# Patient Record
Sex: FEMALE | Race: WHITE | NOT HISPANIC OR LATINO | Employment: UNEMPLOYED | RURAL
[De-identification: names, ages, dates, MRNs, and addresses within clinical notes are randomized per-mention and may not be internally consistent; named-entity substitution may affect disease eponyms.]

---

## 2018-02-05 ENCOUNTER — HISTORICAL (OUTPATIENT)
Dept: ADMINISTRATIVE | Facility: HOSPITAL | Age: 33
End: 2018-02-05

## 2018-02-07 LAB
LAB AP CLINICAL INFORMATION: NORMAL
LAB AP GENERAL CAT - HISTORICAL: NORMAL
LAB AP INTERPRETATION/RESULT - HISTORICAL: NEGATIVE
LAB AP SPECIMEN ADEQUACY - HISTORICAL: NORMAL
LAB AP SPECIMEN SUBMITTED - HISTORICAL: NORMAL

## 2020-05-07 ENCOUNTER — HISTORICAL (OUTPATIENT)
Dept: ADMINISTRATIVE | Facility: HOSPITAL | Age: 35
End: 2020-05-07

## 2020-05-12 LAB
DSDNA IGG SERPL IA-ACNC: 11 IU (ref 0–24)
DSDNA IGG SERPL IA-ACNC: NEGATIVE [IU]/ML
ENA AB SER QL IA: 4.4 EUS (ref 0–19.9)
ENA AB SER QL IA: NEGATIVE

## 2020-08-13 ENCOUNTER — HISTORICAL (OUTPATIENT)
Dept: ADMINISTRATIVE | Facility: HOSPITAL | Age: 35
End: 2020-08-13

## 2020-08-13 LAB
ALBUMIN SERPL BCP-MCNC: 3.9 G/DL (ref 3.5–5)
ALBUMIN/GLOB SERPL: 1 {RATIO}
ALP SERPL-CCNC: 94 U/L (ref 37–98)
ALT SERPL W P-5'-P-CCNC: 32 U/L (ref 13–56)
AMPHET UR QL SCN: POSITIVE NG/ML
ANION GAP SERPL CALCULATED.3IONS-SCNC: 10 MMOL/L
AST SERPL W P-5'-P-CCNC: 27 U/L (ref 15–37)
BARBITURATES UR QL SCN: NEGATIVE NG/ML
BASOPHILS # BLD AUTO: 0.05 X10E3/UL (ref 0–0.2)
BASOPHILS NFR BLD AUTO: 0.5 % (ref 0–1)
BENZODIAZ METAB UR QL SCN: NEGATIVE NG/ML
BILIRUB SERPL-MCNC: 0.1 MG/DL (ref 0–1.2)
BILIRUB UR QL STRIP: NEGATIVE MG/DL
BUN SERPL-MCNC: 19 MG/DL (ref 7–18)
BUN/CREAT SERPL: 20
CALCIUM SERPL-MCNC: 9 MG/DL (ref 8.5–10.1)
CANNABINOIDS UR QL SCN: NEGATIVE NG/ML
CHLORIDE SERPL-SCNC: 103 MMOL/L (ref 98–107)
CLARITY UR: CLEAR
CO2 SERPL-SCNC: 29 MMOL/L (ref 21–32)
COCAINE UR QL SCN: NEGATIVE NG/ML
COLOR UR: YELLOW
CREAT SERPL-MCNC: 0.95 MG/DL (ref 0.55–1.02)
EOSINOPHIL # BLD AUTO: 0.26 X10E3/UL (ref 0–0.5)
EOSINOPHIL NFR BLD AUTO: 2.7 % (ref 1–4)
ERYTHROCYTE [DISTWIDTH] IN BLOOD BY AUTOMATED COUNT: 13.5 % (ref 11.5–14.5)
GLOBULIN SER-MCNC: 4.1 G/DL (ref 2–4)
GLUCOSE SERPL-MCNC: 86 MG/DL (ref 74–106)
GLUCOSE UR STRIP-MCNC: NEGATIVE MG/DL
HCT VFR BLD AUTO: 38 % (ref 38–47)
HGB BLD-MCNC: 12 G/DL (ref 12–16)
IMM GRANULOCYTES # BLD AUTO: 0.03 X10E3/UL (ref 0–0.04)
IMM GRANULOCYTES NFR BLD: 0.3 % (ref 0–0.4)
KETONES UR STRIP-SCNC: NEGATIVE MG/DL
LEUKOCYTE ESTERASE UR QL STRIP: NEGATIVE LEU/UL
LYMPHOCYTES # BLD AUTO: 2.37 X10E3/UL (ref 1–4.8)
LYMPHOCYTES NFR BLD AUTO: 24.9 % (ref 27–41)
MCH RBC QN AUTO: 29.2 PG (ref 27–31)
MCHC RBC AUTO-ENTMCNC: 31.6 G/DL (ref 32–36)
MCV RBC AUTO: 92.5 FL (ref 80–96)
MONOCYTES # BLD AUTO: 0.73 X10E3/UL (ref 0–0.8)
MONOCYTES NFR BLD AUTO: 7.7 % (ref 2–6)
MPC BLD CALC-MCNC: 9.8 FL (ref 9.4–12.4)
NEUTROPHILS # BLD AUTO: 6.08 X10E3/UL (ref 1.8–7.7)
NEUTROPHILS NFR BLD AUTO: 63.9 % (ref 53–65)
NITRITE UR QL STRIP: NEGATIVE
NRBC # BLD AUTO: 0 X10E3/UL (ref 0–0)
NRBC, AUTO (.00): 0 /100 (ref 0–0)
OPIATES UR QL SCN: NEGATIVE NG/ML
PCP UR QL SCN: NEGATIVE NG/ML
PH UR STRIP: 5 PH UNITS (ref 5–8)
PLATELET # BLD AUTO: 411 X10E3/UL (ref 150–400)
POTASSIUM SERPL-SCNC: 4.5 MMOL/L (ref 3.5–5.1)
PROT SERPL-MCNC: 8 G/DL (ref 6.4–8.2)
PROT UR QL STRIP: NEGATIVE MG/DL
RBC # BLD AUTO: 4.11 X10E6/UL (ref 4.2–5.4)
RBC # UR STRIP: NEGATIVE ERY/UL
SODIUM SERPL-SCNC: 137 MMOL/L (ref 136–145)
SP GR UR STRIP: 1.03 (ref 1–1.03)
UROBILINOGEN UR STRIP-ACNC: 0.2 EU/DL
WBC # BLD AUTO: 9.52 X10E3/UL (ref 4.5–11)

## 2020-10-09 ENCOUNTER — HISTORICAL (OUTPATIENT)
Dept: ADMINISTRATIVE | Facility: HOSPITAL | Age: 35
End: 2020-10-09

## 2021-06-12 ENCOUNTER — HOSPITAL ENCOUNTER (EMERGENCY)
Facility: HOSPITAL | Age: 36
Discharge: HOME | End: 2021-06-12
Payer: COMMERCIAL

## 2021-06-12 VITALS
OXYGEN SATURATION: 96 % | SYSTOLIC BLOOD PRESSURE: 112 MMHG | TEMPERATURE: 99 F | HEART RATE: 95 BPM | HEIGHT: 65 IN | WEIGHT: 279 LBS | BODY MASS INDEX: 46.48 KG/M2 | RESPIRATION RATE: 18 BRPM | DIASTOLIC BLOOD PRESSURE: 55 MMHG

## 2021-06-12 DIAGNOSIS — R07.9 CHEST PAIN: ICD-10-CM

## 2021-06-12 DIAGNOSIS — F41.9 ANXIETY: ICD-10-CM

## 2021-06-12 DIAGNOSIS — R00.2 PALPITATIONS: Primary | ICD-10-CM

## 2021-06-12 LAB
ALBUMIN SERPL BCP-MCNC: 3.5 G/DL (ref 3.5–5)
ALBUMIN/GLOB SERPL: 0.9 {RATIO}
ALP SERPL-CCNC: 88 U/L (ref 37–98)
ALT SERPL W P-5'-P-CCNC: 20 U/L (ref 13–56)
ANION GAP SERPL CALCULATED.3IONS-SCNC: 13 MMOL/L (ref 7–16)
AST SERPL W P-5'-P-CCNC: 19 U/L (ref 15–37)
BASOPHILS # BLD AUTO: 0.01 K/UL (ref 0–0.2)
BASOPHILS NFR BLD AUTO: 0.1 % (ref 0–1)
BILIRUB SERPL-MCNC: 0.3 MG/DL (ref 0–1.2)
BILIRUB UR QL STRIP: NEGATIVE
BUN SERPL-MCNC: 14 MG/DL (ref 7–18)
BUN/CREAT SERPL: 16 (ref 6–20)
CALCIUM SERPL-MCNC: 9 MG/DL (ref 8.5–10.1)
CHLORIDE SERPL-SCNC: 106 MMOL/L (ref 98–107)
CLARITY UR: CLEAR
CO2 SERPL-SCNC: 26 MMOL/L (ref 21–32)
COLOR UR: YELLOW
CREAT SERPL-MCNC: 0.89 MG/DL (ref 0.55–1.02)
DIFFERENTIAL METHOD BLD: ABNORMAL
EOSINOPHIL # BLD AUTO: 0.14 K/UL (ref 0–0.5)
EOSINOPHIL NFR BLD AUTO: 1.5 % (ref 1–4)
ERYTHROCYTE [DISTWIDTH] IN BLOOD BY AUTOMATED COUNT: 13.2 % (ref 11.5–14.5)
GLOBULIN SER-MCNC: 3.8 G/DL (ref 2–4)
GLUCOSE SERPL-MCNC: 89 MG/DL (ref 74–106)
GLUCOSE UR STRIP-MCNC: NEGATIVE MG/DL
HCG UR QL IA.RAPID: NEGATIVE
HCT VFR BLD AUTO: 34.9 % (ref 38–47)
HGB BLD-MCNC: 11.2 G/DL (ref 12–16)
KETONES UR STRIP-SCNC: NEGATIVE MG/DL
LEUKOCYTE ESTERASE UR QL STRIP: NEGATIVE
LYMPHOCYTES # BLD AUTO: 2.71 K/UL (ref 1–4.8)
LYMPHOCYTES NFR BLD AUTO: 28.7 % (ref 27–41)
MCH RBC QN AUTO: 28.3 PG (ref 27–31)
MCHC RBC AUTO-ENTMCNC: 32.1 G/DL (ref 32–36)
MCV RBC AUTO: 88.1 FL (ref 80–96)
MONOCYTES # BLD AUTO: 0.88 K/UL (ref 0–0.8)
MONOCYTES NFR BLD AUTO: 9.3 % (ref 2–6)
MPC BLD CALC-MCNC: 10.5 FL (ref 9.4–12.4)
NEUTROPHILS # BLD AUTO: 5.7 K/UL (ref 1.8–7.7)
NEUTROPHILS NFR BLD AUTO: 60.4 % (ref 53–65)
NITRITE UR QL STRIP: NEGATIVE
NT-PROBNP SERPL-MCNC: 56 PG/ML (ref 1–125)
PH UR STRIP: 6 PH UNITS
PLATELET # BLD AUTO: 320 K/UL (ref 150–400)
POTASSIUM SERPL-SCNC: 3.4 MMOL/L (ref 3.5–5.1)
PROT SERPL-MCNC: 7.3 G/DL (ref 6.4–8.2)
PROT UR QL STRIP: NEGATIVE
RBC # BLD AUTO: 3.96 M/UL (ref 4.2–5.4)
RBC # UR STRIP: NEGATIVE /UL
SODIUM SERPL-SCNC: 142 MMOL/L (ref 136–145)
SP GR UR STRIP: 1.01
TROPONIN I SERPL-MCNC: <0.017 NG/ML
UROBILINOGEN UR STRIP-ACNC: 0.2 MG/DL
WBC # BLD AUTO: 9.44 K/UL (ref 4.5–11)

## 2021-06-12 PROCEDURE — 84484 ASSAY OF TROPONIN QUANT: CPT | Performed by: FAMILY MEDICINE

## 2021-06-12 PROCEDURE — 36415 COLL VENOUS BLD VENIPUNCTURE: CPT | Performed by: FAMILY MEDICINE

## 2021-06-12 PROCEDURE — 81025 URINE PREGNANCY TEST: CPT | Performed by: FAMILY MEDICINE

## 2021-06-12 PROCEDURE — 99283 PR EMERGENCY DEPT VISIT,LEVEL III: ICD-10-PCS | Mod: ,,, | Performed by: FAMILY MEDICINE

## 2021-06-12 PROCEDURE — 99285 EMERGENCY DEPT VISIT HI MDM: CPT | Mod: 25

## 2021-06-12 PROCEDURE — 94761 N-INVAS EAR/PLS OXIMETRY MLT: CPT

## 2021-06-12 PROCEDURE — 93010 ELECTROCARDIOGRAM REPORT: CPT | Performed by: HOSPITALIST

## 2021-06-12 PROCEDURE — 81003 URINALYSIS AUTO W/O SCOPE: CPT | Performed by: FAMILY MEDICINE

## 2021-06-12 PROCEDURE — 99283 EMERGENCY DEPT VISIT LOW MDM: CPT | Mod: ,,, | Performed by: FAMILY MEDICINE

## 2021-06-12 PROCEDURE — 83880 ASSAY OF NATRIURETIC PEPTIDE: CPT | Performed by: FAMILY MEDICINE

## 2021-06-12 PROCEDURE — 80053 COMPREHEN METABOLIC PANEL: CPT | Performed by: FAMILY MEDICINE

## 2021-06-12 PROCEDURE — 93005 ELECTROCARDIOGRAM TRACING: CPT

## 2021-06-12 PROCEDURE — 93010 EKG 12-LEAD: ICD-10-PCS | Mod: ,,, | Performed by: HOSPITALIST

## 2021-06-12 PROCEDURE — 85025 COMPLETE CBC W/AUTO DIFF WBC: CPT | Performed by: FAMILY MEDICINE

## 2021-06-12 RX ORDER — BUSPIRONE HYDROCHLORIDE 15 MG/1
15 TABLET ORAL 3 TIMES DAILY
COMMUNITY
End: 2022-04-25

## 2021-06-12 RX ORDER — DEXTROAMPHETAMINE SACCHARATE, AMPHETAMINE ASPARTATE MONOHYDRATE, DEXTROAMPHETAMINE SULFATE AND AMPHETAMINE SULFATE 5; 5; 5; 5 MG/1; MG/1; MG/1; MG/1
20 CAPSULE, EXTENDED RELEASE ORAL 2 TIMES DAILY
COMMUNITY
End: 2022-01-16

## 2021-10-10 ENCOUNTER — OFFICE VISIT (OUTPATIENT)
Dept: FAMILY MEDICINE | Facility: CLINIC | Age: 36
End: 2021-10-10
Payer: COMMERCIAL

## 2021-10-10 VITALS
HEART RATE: 74 BPM | HEIGHT: 65 IN | RESPIRATION RATE: 17 BRPM | OXYGEN SATURATION: 98 % | WEIGHT: 272.19 LBS | SYSTOLIC BLOOD PRESSURE: 128 MMHG | BODY MASS INDEX: 45.35 KG/M2 | TEMPERATURE: 99 F | DIASTOLIC BLOOD PRESSURE: 72 MMHG

## 2021-10-10 DIAGNOSIS — M54.50 ACUTE RIGHT-SIDED LOW BACK PAIN WITHOUT SCIATICA: Primary | ICD-10-CM

## 2021-10-10 PROCEDURE — 3078F DIAST BP <80 MM HG: CPT | Mod: ,,, | Performed by: FAMILY MEDICINE

## 2021-10-10 PROCEDURE — 3008F BODY MASS INDEX DOCD: CPT | Mod: ,,, | Performed by: FAMILY MEDICINE

## 2021-10-10 PROCEDURE — 96372 PR INJECTION,THERAP/PROPH/DIAG2ST, IM OR SUBCUT: ICD-10-PCS | Mod: ,,, | Performed by: FAMILY MEDICINE

## 2021-10-10 PROCEDURE — 3008F PR BODY MASS INDEX (BMI) DOCUMENTED: ICD-10-PCS | Mod: ,,, | Performed by: FAMILY MEDICINE

## 2021-10-10 PROCEDURE — 99051 MED SERV EVE/WKEND/HOLIDAY: CPT | Mod: ,,, | Performed by: FAMILY MEDICINE

## 2021-10-10 PROCEDURE — 96372 THER/PROPH/DIAG INJ SC/IM: CPT | Mod: ,,, | Performed by: FAMILY MEDICINE

## 2021-10-10 PROCEDURE — 99213 PR OFFICE/OUTPT VISIT, EST, LEVL III, 20-29 MIN: ICD-10-PCS | Mod: 25,,, | Performed by: FAMILY MEDICINE

## 2021-10-10 PROCEDURE — 1160F PR REVIEW ALL MEDS BY PRESCRIBER/CLIN PHARMACIST DOCUMENTED: ICD-10-PCS | Mod: ,,, | Performed by: FAMILY MEDICINE

## 2021-10-10 PROCEDURE — 1159F PR MEDICATION LIST DOCUMENTED IN MEDICAL RECORD: ICD-10-PCS | Mod: ,,, | Performed by: FAMILY MEDICINE

## 2021-10-10 PROCEDURE — 3074F PR MOST RECENT SYSTOLIC BLOOD PRESSURE < 130 MM HG: ICD-10-PCS | Mod: ,,, | Performed by: FAMILY MEDICINE

## 2021-10-10 PROCEDURE — 1159F MED LIST DOCD IN RCRD: CPT | Mod: ,,, | Performed by: FAMILY MEDICINE

## 2021-10-10 PROCEDURE — 3078F PR MOST RECENT DIASTOLIC BLOOD PRESSURE < 80 MM HG: ICD-10-PCS | Mod: ,,, | Performed by: FAMILY MEDICINE

## 2021-10-10 PROCEDURE — 3074F SYST BP LT 130 MM HG: CPT | Mod: ,,, | Performed by: FAMILY MEDICINE

## 2021-10-10 PROCEDURE — 1160F RVW MEDS BY RX/DR IN RCRD: CPT | Mod: ,,, | Performed by: FAMILY MEDICINE

## 2021-10-10 PROCEDURE — 99051 PR MEDICAL SERVICES, EVE/WKEND/HOLIDAY: ICD-10-PCS | Mod: ,,, | Performed by: FAMILY MEDICINE

## 2021-10-10 PROCEDURE — 99213 OFFICE O/P EST LOW 20 MIN: CPT | Mod: 25,,, | Performed by: FAMILY MEDICINE

## 2021-10-10 RX ORDER — TRAZODONE HYDROCHLORIDE 50 MG/1
75 TABLET ORAL NIGHTLY PRN
COMMUNITY
End: 2022-01-16

## 2021-10-10 RX ORDER — DEXAMETHASONE SODIUM PHOSPHATE 4 MG/ML
6 INJECTION, SOLUTION INTRA-ARTICULAR; INTRALESIONAL; INTRAMUSCULAR; INTRAVENOUS; SOFT TISSUE
Status: COMPLETED | OUTPATIENT
Start: 2021-10-10 | End: 2021-10-10

## 2021-10-10 RX ORDER — TIZANIDINE 4 MG/1
TABLET ORAL
Qty: 30 TABLET | Refills: 2 | Status: SHIPPED | OUTPATIENT
Start: 2021-10-10 | End: 2021-10-10 | Stop reason: SDUPTHER

## 2021-10-10 RX ORDER — TIZANIDINE 4 MG/1
TABLET ORAL
Qty: 30 TABLET | Refills: 2 | Status: SHIPPED | OUTPATIENT
Start: 2021-10-10 | End: 2021-10-18

## 2021-10-10 RX ORDER — TRAMADOL HYDROCHLORIDE 50 MG/1
50 TABLET ORAL EVERY 6 HOURS
Qty: 15 TABLET | Refills: 0 | Status: SHIPPED | OUTPATIENT
Start: 2021-10-10 | End: 2022-04-25

## 2021-10-10 RX ORDER — TRAMADOL HYDROCHLORIDE 50 MG/1
50 TABLET ORAL EVERY 6 HOURS
Qty: 15 TABLET | Refills: 0 | Status: SHIPPED | OUTPATIENT
Start: 2021-10-10 | End: 2021-10-10 | Stop reason: SDUPTHER

## 2021-10-10 RX ADMIN — DEXAMETHASONE SODIUM PHOSPHATE 6 MG: 4 INJECTION, SOLUTION INTRA-ARTICULAR; INTRALESIONAL; INTRAMUSCULAR; INTRAVENOUS; SOFT TISSUE at 02:10

## 2022-01-16 ENCOUNTER — OFFICE VISIT (OUTPATIENT)
Dept: FAMILY MEDICINE | Facility: CLINIC | Age: 37
End: 2022-01-16
Payer: COMMERCIAL

## 2022-01-16 VITALS
SYSTOLIC BLOOD PRESSURE: 140 MMHG | HEIGHT: 65 IN | TEMPERATURE: 98 F | RESPIRATION RATE: 17 BRPM | DIASTOLIC BLOOD PRESSURE: 83 MMHG | BODY MASS INDEX: 43.32 KG/M2 | OXYGEN SATURATION: 99 % | HEART RATE: 98 BPM | WEIGHT: 260 LBS

## 2022-01-16 DIAGNOSIS — Z11.52 ENCOUNTER FOR SCREENING LABORATORY TESTING FOR COVID-19 VIRUS: Primary | ICD-10-CM

## 2022-01-16 DIAGNOSIS — J06.9 VIRAL URI WITH COUGH: ICD-10-CM

## 2022-01-16 PROBLEM — F41.9 ANXIETY: Status: ACTIVE | Noted: 2022-01-16

## 2022-01-16 PROBLEM — G47.00 INSOMNIA: Status: ACTIVE | Noted: 2022-01-16

## 2022-01-16 PROBLEM — F60.3 BORDERLINE PERSONALITY DISORDER: Status: ACTIVE | Noted: 2022-01-16

## 2022-01-16 PROBLEM — J31.2 CHRONIC SORE THROAT: Status: ACTIVE | Noted: 2017-04-26

## 2022-01-16 PROBLEM — G89.29 CHRONIC BACK PAIN: Status: ACTIVE | Noted: 2022-01-16

## 2022-01-16 PROBLEM — M77.9 TENDINITIS: Status: ACTIVE | Noted: 2022-01-16

## 2022-01-16 PROBLEM — F90.9 ATTENTION DEFICIT HYPERACTIVITY DISORDER: Status: ACTIVE | Noted: 2020-10-12

## 2022-01-16 PROBLEM — R07.0 PAIN IN THROAT: Status: ACTIVE | Noted: 2017-04-05

## 2022-01-16 PROBLEM — M54.9 CHRONIC BACK PAIN: Status: ACTIVE | Noted: 2022-01-16

## 2022-01-16 LAB
CTP QC/QA: YES
SARS-COV-2 AG RESP QL IA.RAPID: NEGATIVE

## 2022-01-16 PROCEDURE — 3077F SYST BP >= 140 MM HG: CPT | Mod: ,,, | Performed by: NURSE PRACTITIONER

## 2022-01-16 PROCEDURE — 3008F PR BODY MASS INDEX (BMI) DOCUMENTED: ICD-10-PCS | Mod: ,,, | Performed by: NURSE PRACTITIONER

## 2022-01-16 PROCEDURE — 99213 PR OFFICE/OUTPT VISIT, EST, LEVL III, 20-29 MIN: ICD-10-PCS | Mod: ,,, | Performed by: NURSE PRACTITIONER

## 2022-01-16 PROCEDURE — 99051 PR MEDICAL SERVICES, EVE/WKEND/HOLIDAY: ICD-10-PCS | Mod: ,,, | Performed by: NURSE PRACTITIONER

## 2022-01-16 PROCEDURE — 1159F PR MEDICATION LIST DOCUMENTED IN MEDICAL RECORD: ICD-10-PCS | Mod: ,,, | Performed by: NURSE PRACTITIONER

## 2022-01-16 PROCEDURE — 87426 SARSCOV CORONAVIRUS AG IA: CPT | Mod: QW,,, | Performed by: NURSE PRACTITIONER

## 2022-01-16 PROCEDURE — 99213 OFFICE O/P EST LOW 20 MIN: CPT | Mod: ,,, | Performed by: NURSE PRACTITIONER

## 2022-01-16 PROCEDURE — 87426 SARS CORONAVIRUS 2 ANTIGEN POCT: ICD-10-PCS | Mod: QW,,, | Performed by: NURSE PRACTITIONER

## 2022-01-16 PROCEDURE — 3079F DIAST BP 80-89 MM HG: CPT | Mod: ,,, | Performed by: NURSE PRACTITIONER

## 2022-01-16 PROCEDURE — 3077F PR MOST RECENT SYSTOLIC BLOOD PRESSURE >= 140 MM HG: ICD-10-PCS | Mod: ,,, | Performed by: NURSE PRACTITIONER

## 2022-01-16 PROCEDURE — 1160F RVW MEDS BY RX/DR IN RCRD: CPT | Mod: ,,, | Performed by: NURSE PRACTITIONER

## 2022-01-16 PROCEDURE — 99051 MED SERV EVE/WKEND/HOLIDAY: CPT | Mod: ,,, | Performed by: NURSE PRACTITIONER

## 2022-01-16 PROCEDURE — 1159F MED LIST DOCD IN RCRD: CPT | Mod: ,,, | Performed by: NURSE PRACTITIONER

## 2022-01-16 PROCEDURE — 1160F PR REVIEW ALL MEDS BY PRESCRIBER/CLIN PHARMACIST DOCUMENTED: ICD-10-PCS | Mod: ,,, | Performed by: NURSE PRACTITIONER

## 2022-01-16 PROCEDURE — 3079F PR MOST RECENT DIASTOLIC BLOOD PRESSURE 80-89 MM HG: ICD-10-PCS | Mod: ,,, | Performed by: NURSE PRACTITIONER

## 2022-01-16 PROCEDURE — 3008F BODY MASS INDEX DOCD: CPT | Mod: ,,, | Performed by: NURSE PRACTITIONER

## 2022-01-16 RX ORDER — PROMETHAZINE HYDROCHLORIDE AND DEXTROMETHORPHAN HYDROBROMIDE 6.25; 15 MG/5ML; MG/5ML
5 SYRUP ORAL EVERY 6 HOURS PRN
Qty: 150 ML | Refills: 0 | Status: SHIPPED | OUTPATIENT
Start: 2022-01-16 | End: 2022-01-26

## 2022-01-16 RX ORDER — DICLOFENAC SODIUM 75 MG/1
TABLET, DELAYED RELEASE ORAL
COMMUNITY
End: 2022-04-25

## 2022-01-16 RX ORDER — MELOXICAM 7.5 MG/1
TABLET ORAL
COMMUNITY
End: 2022-04-25

## 2022-01-16 RX ORDER — BUPROPION HYDROCHLORIDE 300 MG/1
TABLET ORAL
COMMUNITY
End: 2022-04-25

## 2022-01-16 RX ORDER — GABAPENTIN 100 MG/1
CAPSULE ORAL
COMMUNITY
End: 2022-04-25

## 2022-01-16 RX ORDER — ARIPIPRAZOLE 10 MG/1
TABLET ORAL
COMMUNITY
End: 2022-08-18

## 2022-01-16 RX ORDER — ALPRAZOLAM 0.25 MG/1
TABLET ORAL
COMMUNITY
Start: 2021-12-15 | End: 2022-05-19

## 2022-01-16 RX ORDER — DEXTROAMPHETAMINE SACCHARATE, AMPHETAMINE ASPARTATE, DEXTROAMPHETAMINE SULFATE AND AMPHETAMINE SULFATE 5; 5; 5; 5 MG/1; MG/1; MG/1; MG/1
TABLET ORAL
COMMUNITY
End: 2022-08-18

## 2022-01-16 RX ORDER — ZOLPIDEM TARTRATE 10 MG/1
TABLET ORAL
COMMUNITY
End: 2022-04-25

## 2022-01-16 RX ORDER — TOPIRAMATE 50 MG/1
TABLET, FILM COATED ORAL
COMMUNITY
End: 2022-04-25

## 2022-01-16 RX ORDER — TRAZODONE HYDROCHLORIDE 150 MG/1
TABLET ORAL
COMMUNITY
End: 2022-04-25

## 2022-01-16 NOTE — PROGRESS NOTES
"Subjective:       Patient ID: Mercedes Saldivar is a 36 y.o. female.    Chief Complaint: COVID-19 Concerns (Fever/sore throat/runny nose/body aches/fatigue/exposed)    S/S for a couple of days. Boyfriend in clinic with her and tested positive for Covid.    Review of Systems   Constitutional: Positive for chills, fever and malaise/fatigue.   HENT: Positive for congestion and sore throat. Negative for ear discharge, ear pain and sinus pain.    Respiratory: Positive for cough. Negative for shortness of breath and wheezing.    Cardiovascular: Negative.    Gastrointestinal: Negative.    Genitourinary: Negative.    Musculoskeletal: Positive for myalgias.   Neurological: Positive for headaches.          Reviewed family, medical, surgical, and social history.    Objective:      BP (!) 140/83   Pulse 98   Temp 98.1 °F (36.7 °C)   Resp 17   Ht 5' 5" (1.651 m)   Wt 117.9 kg (260 lb)   SpO2 99%   BMI 43.27 kg/m²   Physical Exam  Vitals and nursing note reviewed.   Constitutional:       Appearance: Normal appearance.   HENT:      Head: Normocephalic and atraumatic.      Right Ear: Hearing, tympanic membrane, ear canal and external ear normal.      Left Ear: Hearing, tympanic membrane, ear canal and external ear normal.      Nose: Nasal tenderness, mucosal edema, congestion and rhinorrhea present. Rhinorrhea is clear.      Right Turbinates: Enlarged and swollen.      Left Turbinates: Enlarged and swollen.      Mouth/Throat:      Lips: Pink.      Mouth: Mucous membranes are moist.      Pharynx: Uvula midline. Posterior oropharyngeal erythema present. No pharyngeal swelling, oropharyngeal exudate or uvula swelling.      Tonsils: No tonsillar exudate or tonsillar abscesses.   Cardiovascular:      Rate and Rhythm: Normal rate and regular rhythm.      Heart sounds: Normal heart sounds.   Pulmonary:      Effort: Pulmonary effort is normal.      Breath sounds: Normal breath sounds.   Skin:     General: Skin is warm and dry. "   Neurological:      Mental Status: She is alert.   Psychiatric:         Mood and Affect: Mood normal.         Behavior: Behavior normal.         Thought Content: Thought content normal.         Judgment: Judgment normal.            Office Visit on 01/16/2022   Component Date Value Ref Range Status    SARS Coronavirus 2 Antigen 01/16/2022 Negative  Negative Final     Acceptable 01/16/2022 Yes   Final      Assessment:       1. Encounter for screening laboratory testing for COVID-19 virus    2. Viral URI with cough        Plan:       Encounter for screening laboratory testing for COVID-19 virus  -     SARS Coronavirus 2 Antigen, POCT    Viral URI with cough  -     promethazine-dextromethorphan (PROMETHAZINE-DM) 6.25-15 mg/5 mL Syrp; Take 5 mLs by mouth every 6 (six) hours as needed (cough).  Dispense: 150 mL; Refill: 0    Quarantine for 5-7 days  OTC meds for symptomatic relief  Drink plenty of fluids  Go to ER with any respiratory distress  Copy of result and work/school note given. Copy of these instructions given  RTC PRN          Risks, benefits, and side effects were discussed with the patient. All questions were answered to the fullest satisfaction of the patient, and pt verbalized understanding and agreement to treatment plan. Pt was to call with any new or worsening symptoms, or present to the ER.

## 2022-04-25 ENCOUNTER — OFFICE VISIT (OUTPATIENT)
Dept: VASCULAR SURGERY | Facility: CLINIC | Age: 37
End: 2022-04-25
Payer: COMMERCIAL

## 2022-04-25 VITALS
DIASTOLIC BLOOD PRESSURE: 84 MMHG | WEIGHT: 272.63 LBS | RESPIRATION RATE: 12 BRPM | HEART RATE: 76 BPM | HEIGHT: 65 IN | SYSTOLIC BLOOD PRESSURE: 134 MMHG | BODY MASS INDEX: 45.42 KG/M2

## 2022-04-25 DIAGNOSIS — M79.605 PAIN OF LEFT LOWER EXTREMITY: Primary | ICD-10-CM

## 2022-04-25 DIAGNOSIS — I87.1 COMPRESSION OF VEIN: ICD-10-CM

## 2022-04-25 DIAGNOSIS — R60.0 EDEMA OF LEFT LOWER EXTREMITY: ICD-10-CM

## 2022-04-25 DIAGNOSIS — L81.9 HYPERPIGMENTATION OF SKIN: ICD-10-CM

## 2022-04-25 PROCEDURE — 3008F PR BODY MASS INDEX (BMI) DOCUMENTED: ICD-10-PCS | Mod: ,,, | Performed by: NURSE PRACTITIONER

## 2022-04-25 PROCEDURE — 1159F PR MEDICATION LIST DOCUMENTED IN MEDICAL RECORD: ICD-10-PCS | Mod: ,,, | Performed by: NURSE PRACTITIONER

## 2022-04-25 PROCEDURE — 3075F SYST BP GE 130 - 139MM HG: CPT | Mod: ,,, | Performed by: NURSE PRACTITIONER

## 2022-04-25 PROCEDURE — 3008F BODY MASS INDEX DOCD: CPT | Mod: ,,, | Performed by: NURSE PRACTITIONER

## 2022-04-25 PROCEDURE — 99204 PR OFFICE/OUTPT VISIT, NEW, LEVL IV, 45-59 MIN: ICD-10-PCS | Mod: S$PBB,,, | Performed by: NURSE PRACTITIONER

## 2022-04-25 PROCEDURE — 99214 OFFICE O/P EST MOD 30 MIN: CPT | Mod: PBBFAC | Performed by: NURSE PRACTITIONER

## 2022-04-25 PROCEDURE — 99204 OFFICE O/P NEW MOD 45 MIN: CPT | Mod: S$PBB,,, | Performed by: NURSE PRACTITIONER

## 2022-04-25 PROCEDURE — 3079F DIAST BP 80-89 MM HG: CPT | Mod: ,,, | Performed by: NURSE PRACTITIONER

## 2022-04-25 PROCEDURE — 1159F MED LIST DOCD IN RCRD: CPT | Mod: ,,, | Performed by: NURSE PRACTITIONER

## 2022-04-25 PROCEDURE — 3075F PR MOST RECENT SYSTOLIC BLOOD PRESS GE 130-139MM HG: ICD-10-PCS | Mod: ,,, | Performed by: NURSE PRACTITIONER

## 2022-04-25 PROCEDURE — 3079F PR MOST RECENT DIASTOLIC BLOOD PRESSURE 80-89 MM HG: ICD-10-PCS | Mod: ,,, | Performed by: NURSE PRACTITIONER

## 2022-04-25 RX ORDER — ALBUTEROL SULFATE 90 UG/1
AEROSOL, METERED RESPIRATORY (INHALATION)
COMMUNITY
Start: 2022-01-19 | End: 2022-08-18

## 2022-04-25 NOTE — PROGRESS NOTES
VEIN CENTER CLINIC NOTE    Patient ID: Mercedes Saldivar is a 37 y.o. female.    I. HISTORY     Chief Complaint:   Chief Complaint   Patient presents with    Leg Pain     Room 4  Pt here per self for leg pain, last seen here 07/2015 when referred to [facility] at that time.  Stents to left iliac per Dr. Minna Meyer        HPI: Mercedes Saldivar is a 37 y.o. female who is referred here today by self referral for evaluation of left leg pain with swelling.  Symptoms are as described in the chart below and began several  years ago.  Location is left lower leg. Symptoms are progressive and functionally impairing at the end of the day.  History of venous interventions includes Left GSV ablation in 2015 by Dr. Steven Llamas. Patient also has history of Left Iliac stents placement by Dr. Meyer at the [facility] in [place]. She reports she has had continued left leg pain ever since prior to and after her procedures in 2015. She report her leg symptoms improved somewhat for about 2 weeks after Vein procedures but her pain never completely resolved. She says her pain has progressively gotten worse and is now to the point that it is not tolerable. No open ulcers. No history of DVT.  No family history of venous disease.  She reports she has continued to wear 20-30 mmHg compression since 2015, she elevates her legs periodically throughout the day and performs calf pumping exercises, as well as, elevates her legs periodically throughout the day.        Venous Disease Medical Necessity Documentation Initial Visit Date: 4-26-22 Return Check Date:    1. Have you ever had a rupture or bleed from a varicose vein in your leg(s)?              [] Yes  [x] No   [] Yes   [] No   2. Have you ever been diagnosed with phlebitis, cellulitis, or inflammation in the area of the varicose veins of  your leg(s)?  [] Yes  [x] No    [] Yes   [] No   3. Do you have darkened or inflamed skin on your legs?   [] Yes   [x] No   [] Yes   [] No   4. Do you have  leg swelling?     [x] Yes   [] No   [] Yes   [] No   5. Do you have leg pain?   [x] Yes   [] No   [] Yes   [] No   If yes, describe the type of pain?    []   Stabbing []  Radiating []  Aching   []  Tightness [x]  Throbbing               [x]  Burning []  Cramping ( +)  Nagging pain left leg              6. Do you have leg discomfort?   [x] Yes   [] No   [] Yes   [] No   If yes, describe the type of discomfort?    []  Heaviness [x]  Fullness   [x]  Restlessness [x] Tired/Fatigued [x] Itching              7. Have you ever worn compression hose?   [x] Yes   [] No   [] Yes   [] No   If yes, how long?    7 years       8. Do you elevate your legs while sitting?   [x] Yes   [] No   [] Yes   [] No   9. Does venous disease (varicose veins, ulcers, skin changes, leg pain/swelling) interfere with your daily life?  If yes, check activities you are limited or unable to do.    [x]  Shower  [x]   Walk  [x]  Exercise  [] Play with children/grandchildren  []  Shop [] Work [x] Stand for any period of time [x] Sleep                               [] Sitting for an extended period of time.           [x] Yes   [] No   [] Yes   [] No   10. Do you exercise/have you tried to exercise (i.e.  Walk our participate in a regular exercise routine)?  [x] Yes, calf exercises and walks  [] No   [] Yes   [] No   11. BMI 45.4             Past Medical History:   Diagnosis Date    ADHD     Anxiety     Edema, lower extremity     Pain in left leg     Venous insufficiency         Past Surgical History:   Procedure Laterality Date    ABDOMINAL SURGERY  11/18/2020    Gastric sleeve performed by Dr Villarreal in [place]    APPENDECTOMY      EPIDURAL STEROID INJECTION INTO LUMBAR SPINE  2012    L4-5, in [place].    TONSILLECTOMY      VENOUS ABLATION Left 03/30/2015    GSV Ablation performed by Dr. Steven Llamas.       Social History     Tobacco Use   Smoking Status Never Smoker   Smokeless Tobacco Never Used         Current Outpatient Medications:      ALPRAZolam (XANAX) 0.25 MG tablet, , Disp: , Rfl:     ARIPiprazole (ABILIFY) 10 MG Tab, Abilify 10 mg tablet  Take 1 tablet every day by oral route., Disp: , Rfl:     cyanocobalamin, vitamin B-12, (VITAMIN B-12 ORAL), Take by mouth., Disp: , Rfl:     dextroamphetamine-amphetamine (ADDERALL) 20 mg tablet, Adderall 20 mg tablet  Take 1 tablet twice a day by oral route., Disp: , Rfl:     multivitamin capsule, Take 1 capsule by mouth once daily., Disp: , Rfl:     thiamine HCl (VITAMIN B-1 ORAL), Take by mouth., Disp: , Rfl:     albuterol (PROVENTIL/VENTOLIN HFA) 90 mcg/actuation inhaler, , Disp: , Rfl:     CALCIUM CITRATE ORAL, Take by mouth., Disp: , Rfl:     tiZANidine (ZANAFLEX) 4 MG tablet, TAKE 1 OR 2 TABLETS BY MOUTH AT BEDTIME FOR MUSCLE SPASMS (Patient not taking: Reported on 4/25/2022), Disp: 180 tablet, Rfl: 1    Review of Systems   Constitutional: Negative for fatigue and fever.   Eyes: Negative for pain.   Respiratory: Negative for chest tightness and shortness of breath.    Cardiovascular: Positive for leg swelling. Negative for chest pain.   Gastrointestinal: Negative for abdominal pain.   Musculoskeletal: Positive for leg pain.        Chronic darker skin changes bilaterally   Neurological: Negative for syncope.   Psychiatric/Behavioral: Negative for sleep disturbance.          II. PHYSICAL EXAM     Physical Exam  Vitals reviewed.   Constitutional:       General: She is not in acute distress.     Appearance: She is obese.   HENT:      Head: Normocephalic.   Eyes:      Pupils: Pupils are equal, round, and reactive to light.   Cardiovascular:      Rate and Rhythm: Normal rate and regular rhythm.   Pulmonary:      Effort: Pulmonary effort is normal.   Abdominal:      Palpations: Abdomen is soft.   Musculoskeletal:         General: No swelling. Normal range of motion.      Cervical back: Normal range of motion and neck supple.      Left lower leg: Edema present.      Comments: Mild to moderate swelling  left leg   Skin:     General: Skin is warm and dry.   Neurological:      Mental Status: She is alert and oriented to person, place, and time.           CEAP Classification     Venous Clinical Severity Score     III. ASSESSMENT & PLAN (MEDICAL DECISION MAKING)     1. Pain of left lower extremity    2. Compression of vein    3. Edema of left lower extremity    4. Hyperpigmentation of skin        Assessment/Diagnosis and Plan:  Patient has complaints, symptoms and physical exam findings of chronic venous disease.  Therefore, I will order a left leg complete venous reflux study and see the patient back with results. She has met the conservative management requirements in that she has worn 20-30 mmHg compression for 7 years, she elevates her legs therapeutically periodically throughout the day and performs calf pumping exercises.   Will schedule for a left leg complete and f/u with Dr. aD Silva for results.   History of Lt. GSV ablation in 2015 and placement of Iliac Stents x 4 by Dr. Meyer at the [facility] in 2016            MELIZA Rodriguez

## 2022-04-26 ENCOUNTER — HOSPITAL ENCOUNTER (OUTPATIENT)
Dept: RADIOLOGY | Facility: HOSPITAL | Age: 37
Discharge: HOME | End: 2022-04-26
Attending: NURSE PRACTITIONER
Payer: COMMERCIAL

## 2022-04-26 ENCOUNTER — OFFICE VISIT (OUTPATIENT)
Dept: VASCULAR SURGERY | Facility: CLINIC | Age: 37
End: 2022-04-26
Payer: COMMERCIAL

## 2022-04-26 VITALS
HEIGHT: 65 IN | SYSTOLIC BLOOD PRESSURE: 122 MMHG | WEIGHT: 272 LBS | RESPIRATION RATE: 16 BRPM | DIASTOLIC BLOOD PRESSURE: 76 MMHG | BODY MASS INDEX: 45.32 KG/M2 | HEART RATE: 88 BPM

## 2022-04-26 DIAGNOSIS — M79.605 PAIN OF LEFT LOWER EXTREMITY: ICD-10-CM

## 2022-04-26 DIAGNOSIS — R60.0 EDEMA OF LEFT LOWER EXTREMITY: ICD-10-CM

## 2022-04-26 DIAGNOSIS — I89.0 LYMPHEDEMA DUE TO VENOUS INSUFFICIENCY: ICD-10-CM

## 2022-04-26 DIAGNOSIS — I87.1 MAY-THURNER SYNDROME: Primary | ICD-10-CM

## 2022-04-26 DIAGNOSIS — I87.1 COMPRESSION OF VEIN: ICD-10-CM

## 2022-04-26 DIAGNOSIS — L81.9 HYPERPIGMENTATION OF SKIN: ICD-10-CM

## 2022-04-26 DIAGNOSIS — I87.2 LYMPHEDEMA DUE TO VENOUS INSUFFICIENCY: ICD-10-CM

## 2022-04-26 PROCEDURE — 3074F SYST BP LT 130 MM HG: CPT | Mod: ,,, | Performed by: FAMILY MEDICINE

## 2022-04-26 PROCEDURE — 1159F PR MEDICATION LIST DOCUMENTED IN MEDICAL RECORD: ICD-10-PCS | Mod: ,,, | Performed by: FAMILY MEDICINE

## 2022-04-26 PROCEDURE — 3008F BODY MASS INDEX DOCD: CPT | Mod: ,,, | Performed by: FAMILY MEDICINE

## 2022-04-26 PROCEDURE — 3078F PR MOST RECENT DIASTOLIC BLOOD PRESSURE < 80 MM HG: ICD-10-PCS | Mod: ,,, | Performed by: FAMILY MEDICINE

## 2022-04-26 PROCEDURE — 99214 PR OFFICE/OUTPT VISIT, EST, LEVL IV, 30-39 MIN: ICD-10-PCS | Mod: S$PBB,,, | Performed by: FAMILY MEDICINE

## 2022-04-26 PROCEDURE — 93971 EXTREMITY STUDY: CPT | Mod: 26,,, | Performed by: FAMILY MEDICINE

## 2022-04-26 PROCEDURE — 99214 OFFICE O/P EST MOD 30 MIN: CPT | Mod: PBBFAC,25 | Performed by: FAMILY MEDICINE

## 2022-04-26 PROCEDURE — 93971 US VENOUS REFLUX STUDY UNILATERAL: ICD-10-PCS | Mod: 26,,, | Performed by: FAMILY MEDICINE

## 2022-04-26 PROCEDURE — 1160F RVW MEDS BY RX/DR IN RCRD: CPT | Mod: ,,, | Performed by: FAMILY MEDICINE

## 2022-04-26 PROCEDURE — 99214 OFFICE O/P EST MOD 30 MIN: CPT | Mod: S$PBB,,, | Performed by: FAMILY MEDICINE

## 2022-04-26 PROCEDURE — 3008F PR BODY MASS INDEX (BMI) DOCUMENTED: ICD-10-PCS | Mod: ,,, | Performed by: FAMILY MEDICINE

## 2022-04-26 PROCEDURE — 1160F PR REVIEW ALL MEDS BY PRESCRIBER/CLIN PHARMACIST DOCUMENTED: ICD-10-PCS | Mod: ,,, | Performed by: FAMILY MEDICINE

## 2022-04-26 PROCEDURE — 3074F PR MOST RECENT SYSTOLIC BLOOD PRESSURE < 130 MM HG: ICD-10-PCS | Mod: ,,, | Performed by: FAMILY MEDICINE

## 2022-04-26 PROCEDURE — 1159F MED LIST DOCD IN RCRD: CPT | Mod: ,,, | Performed by: FAMILY MEDICINE

## 2022-04-26 PROCEDURE — 93971 EXTREMITY STUDY: CPT | Mod: TC

## 2022-04-26 PROCEDURE — 3078F DIAST BP <80 MM HG: CPT | Mod: ,,, | Performed by: FAMILY MEDICINE

## 2022-04-26 RX ORDER — BUSPIRONE HYDROCHLORIDE 10 MG/1
10 TABLET ORAL 2 TIMES DAILY
COMMUNITY
Start: 2022-04-25 | End: 2022-08-18

## 2022-04-26 NOTE — PROGRESS NOTES
VEIN CENTER CLINIC NOTE    Patient ID: Mercedes Saldivar is a 37 y.o. female.    I. HISTORY     Chief Complaint:   Chief Complaint   Patient presents with    Results     Room 4  US bilateral complete        HPI: Mercedes Saldivar is a 37 y.o. female who presents today for follow-up and results to a left lower extremity complete venous reflux study.  The study performed today, shows no evidence of DVT in the left lower extremity.  There is valvular incompetence with associated deep venous reflux of the left common femoral vein.  Left iliac stent appears patent.  I also shows a well ablated left great saphenous vein proximally.  Superficial venous reflux noted in the left distal great saphenous vein.  No reflux in the left small saphenous vein.    Patient originally presented yesterday by self referral for evaluation of left leg pain with swelling.  Symptoms are as described in the chart below and began several  years ago.  Location is left lower leg. Symptoms are progressive and functionally impairing at the end of the day.  History of venous interventions includes Left GSV ablation in 2015 by Dr. Steven Llamas. Patient also has history of Left Iliac stents placement by Dr. Meyer at the [facility] in [place]. She reports she has had continued left leg pain ever since prior to and after her procedures in 2015. She report her leg symptoms improved somewhat for about 2 weeks after Vein procedures but her pain never completely resolved. She says her pain has progressively gotten worse and is now to the point that it is not tolerable. No open ulcers. No history of DVT.  No family history of venous disease.  She reports she has continued to wear 20-30 mmHg compression since 2015, she elevates her legs periodically throughout the day and performs calf pumping exercises, as well as, elevates her legs periodically throughout the day.  History of Lt. GSV ablation in 2015 and placement of Iliac Stents x 4 by Dr. Meyer at the [facility]  in 2016      Venous Disease Medical Necessity Documentation Initial Visit Date: 4-26-22 Return Check Date:    1. Have you ever had a rupture or bleed from a varicose vein in your leg(s)?              [] Yes  [x] No   [] Yes   [] No   2. Have you ever been diagnosed with phlebitis, cellulitis, or inflammation in the area of the varicose veins of  your leg(s)?  [] Yes  [x] No    [] Yes   [] No   3. Do you have darkened or inflamed skin on your legs?   [] Yes   [x] No   [] Yes   [] No   4. Do you have leg swelling?     [x] Yes   [] No   [] Yes   [] No   5. Do you have leg pain?   [x] Yes   [] No   [] Yes   [] No   If yes, describe the type of pain?    []   Stabbing []  Radiating []  Aching   []  Tightness [x]  Throbbing               [x]  Burning []  Cramping ( +)  Nagging pain left leg              6. Do you have leg discomfort?   [x] Yes   [] No   [] Yes   [] No   If yes, describe the type of discomfort?    []  Heaviness [x]  Fullness   [x]  Restlessness [x] Tired/Fatigued [x] Itching              7. Have you ever worn compression hose?   [x] Yes   [] No   [] Yes   [] No   If yes, how long?    7 years       8. Do you elevate your legs while sitting?   [x] Yes   [] No   [] Yes   [] No   9. Does venous disease (varicose veins, ulcers, skin changes, leg pain/swelling) interfere with your daily life?  If yes, check activities you are limited or unable to do.    [x]  Shower  [x]   Walk  [x]  Exercise  [] Play with children/grandchildren  []  Shop [] Work [x] Stand for any period of time [x] Sleep                               [] Sitting for an extended period of time.           [x] Yes   [] No   [] Yes   [] No   10. Do you exercise/have you tried to exercise (i.e.  Walk our participate in a regular exercise routine)?  [x] Yes, calf exercises and walks  [] No   [] Yes   [] No   11. BMI 45.4             Past Medical History:   Diagnosis Date    ADHD     Anxiety     Edema, lower extremity     Pain in left leg     Venous  insufficiency         Past Surgical History:   Procedure Laterality Date    ABDOMINAL SURGERY  11/18/2020    Gastric sleeve performed by Dr Villarreal in [place]    APPENDECTOMY      EPIDURAL STEROID INJECTION INTO LUMBAR SPINE  2012    L4-5, in [place].    TONSILLECTOMY      VENOUS ABLATION Left 03/30/2015    GSV Ablation performed by Dr. Steven Llamas.       Social History     Tobacco Use   Smoking Status Never Smoker   Smokeless Tobacco Never Used         Current Outpatient Medications:     albuterol (PROVENTIL/VENTOLIN HFA) 90 mcg/actuation inhaler, , Disp: , Rfl:     ALPRAZolam (XANAX) 0.25 MG tablet, , Disp: , Rfl:     ARIPiprazole (ABILIFY) 10 MG Tab, Abilify 10 mg tablet  Take 1 tablet every day by oral route., Disp: , Rfl:     busPIRone (BUSPAR) 10 MG tablet, Take 10 mg by mouth 2 (two) times daily., Disp: , Rfl:     CALCIUM CITRATE ORAL, Take by mouth., Disp: , Rfl:     cyanocobalamin, vitamin B-12, (VITAMIN B-12 ORAL), Take by mouth., Disp: , Rfl:     dextroamphetamine-amphetamine (ADDERALL) 20 mg tablet, Adderall 20 mg tablet  Take 1 tablet twice a day by oral route., Disp: , Rfl:     multivitamin capsule, Take 1 capsule by mouth once daily., Disp: , Rfl:     thiamine HCl (VITAMIN B-1 ORAL), Take by mouth., Disp: , Rfl:     tiZANidine (ZANAFLEX) 4 MG tablet, TAKE 1 OR 2 TABLETS BY MOUTH AT BEDTIME FOR MUSCLE SPASMS (Patient not taking: Reported on 4/25/2022), Disp: 180 tablet, Rfl: 1    Review of Systems   Constitutional: Negative for fatigue and fever.   Eyes: Negative for pain.   Respiratory: Negative for chest tightness and shortness of breath.    Cardiovascular: Positive for leg swelling. Negative for chest pain.   Gastrointestinal: Negative for abdominal pain.   Musculoskeletal: Positive for leg pain.        Chronic darker skin changes bilaterally   Neurological: Negative for syncope.   Psychiatric/Behavioral: Negative for sleep disturbance.          II. PHYSICAL EXAM     Physical Exam  Vitals  reviewed.   Constitutional:       General: She is not in acute distress.     Appearance: She is obese.   HENT:      Head: Normocephalic.   Eyes:      Pupils: Pupils are equal, round, and reactive to light.   Cardiovascular:      Rate and Rhythm: Normal rate and regular rhythm.   Pulmonary:      Effort: Pulmonary effort is normal.   Abdominal:      Palpations: Abdomen is soft.   Musculoskeletal:         General: No swelling. Normal range of motion.      Cervical back: Normal range of motion and neck supple.      Left lower leg: Edema present.      Comments: Mild to moderate swelling left leg   Skin:     General: Skin is warm and dry.   Neurological:      Mental Status: She is alert and oriented to person, place, and time.         CEAP Classification  Clinical Signs: Class 3 - Edema  Etiologic Classification: Primary  Anatomic distribution: Deep  Pathophysiologic dysfunction: Reflux       Venous Clinical Severity Score  Pain:2=Daily, moderate activity limitation, occasional analgesics  Varicose Veins: 0=None  Venous Edema: 3=Morning edema above ankle and requiring activity change, elevation  Pigmentation: 0=None or focal, low intensity (tan)  Inflammation: 0=None  Induration: 0=None  Number of Active Ulcers: 0=0  Active Ulceration, Duration: 0=None  Active Ulcer Size: 0=None  Compressive Therapy: 0=Not used or not compliant  Total Score: 5       III. 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 Pain of left lower extremity    3. Edema of left lower extremity    4. Lymphedema due to venous insufficiency      Assessment/Diagnosis and Plan:  Patient's ultrasound shows evidence of deep venous reflux and superficial distal great saphenous vein reflux.  Iliac stents appear patent.  Symptoms are not consistent with that amount reflux.  She may also have an element of musculoskeletal or neurological deficit.  I will send the patient to our lymphedema therapist here [place] to start therapy.  We  may also consider lymphedema pump device.  Follow-up 6 weeks.      Nigel Da Silva, DO

## 2022-04-27 DIAGNOSIS — I89.0 LYMPHEDEMA DUE TO VENOUS INSUFFICIENCY: ICD-10-CM

## 2022-04-27 DIAGNOSIS — I87.1 MAY-THURNER SYNDROME: Primary | ICD-10-CM

## 2022-04-27 DIAGNOSIS — I89.0 LYMPHEDEMA: Primary | ICD-10-CM

## 2022-04-27 DIAGNOSIS — I87.2 LYMPHEDEMA DUE TO VENOUS INSUFFICIENCY: ICD-10-CM

## 2022-05-04 ENCOUNTER — CLINICAL SUPPORT (OUTPATIENT)
Dept: REHABILITATION | Facility: HOSPITAL | Age: 37
End: 2022-05-04
Attending: FAMILY MEDICINE
Payer: COMMERCIAL

## 2022-05-04 DIAGNOSIS — I89.0 LYMPHEDEMA: ICD-10-CM

## 2022-05-04 PROCEDURE — 97165 OT EVAL LOW COMPLEX 30 MIN: CPT

## 2022-05-04 PROCEDURE — 97140 MANUAL THERAPY 1/> REGIONS: CPT

## 2022-05-04 NOTE — PLAN OF CARE
[facility]            Occupational Therapy Initial Evaluation    Name: Mercedes Saldivar  Clinic Number: [id number]    Therapy Diagnosis:   Encounter Diagnosis   Name Primary?    Lymphedema      Physician: Nigel Da Silva DO    Physician Orders: Eval and treat LLE lymphedema with MLD and compression   Medical Diagnosis from Referral: Lymphedema   Evaluation Date: 5/4/2022  Authorization Period Expiration: 12/31/2022  Plan of Care Expiration: 5/4/2022-7/29/2022  Visit # / Visits authorized: 1/ 20 OT visits per calendar year due to pt with [employer] [place]     Time In: 3:02  Time Out: 3:50  Total Billable Time: 48 minutes    Precautions: Standard    Subjective     Date of onset: 2015  History of current condition - Summer reports: Increased pain and swelling recently. Pt reported pain as throbbing and burning pain. Pt reported increased difficulty performing house work. Pt also reported that she is unable to take a shower and has to take baths. Pt had lymphedema therapy prior to surgery in 2015 and has not had any therapy for the lymphedema since then. Pt does not currently have a lymphedema pump for use at home. Pt reported pain and swelling has been constant. Pt wears a waist high compression garment during the day and also reported that she elevates her left lower extremity throughout the day. Pt concerned due to recent increase in swelling and pain which is greatly impacting her mobility and performing everyday tasks as well as household chores.    Per office visit with Dr. Da Silva 4/26/2022:     HPI: Mercedes Saldivar is a 37 y.o. female who presents today for follow-up and results to a left lower extremity complete venous reflux study.  The study performed today, shows no evidence of DVT in the left lower extremity.  There is valvular incompetence with associated deep venous reflux of the left common femoral vein.  Left iliac stent appears patent.  I also shows a well ablated left great saphenous vein  proximally.  Superficial venous reflux noted in the left distal great saphenous vein.  No reflux in the left small saphenous vein.     Patient originally presented yesterday by self referral for evaluation of left leg pain with swelling.  Symptoms are as described in the chart below and began several  years ago.  Location is left lower leg. Symptoms are progressive and functionally impairing at the end of the day.  History of venous interventions includes Left GSV ablation in 2015 by Dr. Steven Llamas. Patient also has history of Left Iliac stents placement by Dr. Meyer at the [facility] in [place]. She reports she has had continued left leg pain ever since prior to and after her procedures in 2015. She report her leg symptoms improved somewhat for about 2 weeks after Vein procedures but her pain never completely resolved. She says her pain has progressively gotten worse and is now to the point that it is not tolerable. No open ulcers. No history of DVT.  No family history of venous disease.  She reports she has continued to wear 20-30 mmHg compression since 2015, she elevates her legs periodically throughout the day and performs calf pumping exercises, as well as, elevates her legs periodically throughout the day.  History of Lt. GSV ablation in 2015 and placement of Iliac Stents x 4 by Dr. Meyer at the [facility] in 2016  Assessment/Diagnosis and Plan:  Patient's ultrasound shows evidence of deep venous reflux and superficial distal great saphenous vein reflux.  Iliac stents appear patent.  Symptoms are not consistent with that amount reflux.  She may also have an element of musculoskeletal or neurological deficit.  I will send the patient to our lymphedema therapist here [facility] to start therapy.  We may also consider lymphedema pump device.  Follow-up 6 weeks.      Medical History:   Past Medical History:   Diagnosis Date    ADHD     Anxiety     Edema, lower extremity     Pain in left leg     Venous  "insufficiency        Surgical History:   Summer VISHNU Saldivar  has a past surgical history that includes Appendectomy; Tonsillectomy; Abdominal surgery (11/18/2020); Epidural steroid injection into lumbar spine (2012); and Venous ablation (Left, 03/30/2015).    Medications:   Summer has a current medication list which includes the following prescription(s): albuterol, alprazolam, aripiprazole, buspirone, calcium citrate, cyanocobalamin (vitamin b-12), dextroamphetamine-amphetamine, multivitamin, thiamine hcl, and tizanidine.    Allergies:   Review of patient's allergies indicates:   Allergen Reactions    Adhesive tape-silicones     Latex, natural rubber         Imaging: Yes, in Epic    Surgery: Venous ablation (Left, 03/30/2015). Placement of Iliac Stents x 4 by Dr. Meyer at the [facility] in 2016    Radiation: N/A  Chemotherapy: N/A   Hormonal Medications: N/A   Pt denies CHF, KF, and DM.  Previous Lymphedema Treatment: Yes in 2015 prior to surgeries   Prior Therapy:  Yes in 2015 prior to surgeries   Social History:  lives with an adult   Environmental barriers: N/A   Abuse/Neglect: N/A   Nutritional status: Obese   Educational needs: N/A   Spiritual/Cultural: N/A   Fall risk: Low   Occupation: Pt is a  at [employer] and works full time.   Prior Level of Function: Independent with activities of daily living and instrumental activities of daily living    Current Level of Function: Modified Independent with activities of daily living and instrumental activities of daily living    Gait: independent   Transfers: independent    Bed Mobility: independent      Pain:  Current 4/10, worst 10/10, best 2/10   Location: left lower extremity   Description: Dull, Burning, Throbbing, Numb and Sharp  Aggravating Factors: standing, sitting, lying down, "it doesn't matter what" per pt   Easing Factors: nothing    Pts goals: "to not be in so much pain, to be able to stand longer and to " "walk longer"     Objective     Amount of Swelling: Mild to Moderate swelling left lower extremity   Skin Integrity: Chronic darker skin changes bilaterally. No ulcers, wounds, open wounds, or cellulitis present   Palpation/Texture: warm, dry    Range of Motion - UE/LE  (R) within functional limits    (L) within functional limits      Strength: grossly 4+/5 bilateral lower extremities     Sensation: within functional limits bilateral lower extremities       Girth Measurements (in centimeters)  [facility] LEFT LE  5/4/2022 RIGHT LE  5/4/2022 DIFF   at eval          SBP + 10  66.6 cm 64.5 cm 2.1 cm   SBP 46 cm 42.5 cm 3.5 cm   10 below SBP 44.6 cm 44.2 cm 0.4 cm   20 below SBP 46.5 cm 43.6 cm 2.9 cm   30 below SBP 29.1 cm 26.5 cm 2.6 cm   35 below SBP 24.7 cm 23.1 cm 1.6 cm   Ankle 24.6 cm 23.4 cm 1.2 cm   Forefoot 25.2 cm 24.6 cm 0.6 cm       Treatment    Manual Therapy 15 minutes:   Moisturizing lotion applied to pt's left lower leg. Two layer compression wrap applied to pt's left lower leg.       Home Exercises and Patient Education Provided    Education provided:   - Plan of care and goals   - Pt was educated in lymphedema etiology and management plans.  Pt was provided with written risk reductions and precautions for managing lymphedema.   - precautions of compression wrap as well as when to remove with pt verbalizing understanding and agreement.     This patient is in agreement to participate in Lymphedema treatment.    Written Home Exercises Provided: to be provided at next therapy session     Assessment   Summer is a 37 y.o. female referred to outpatient Occupational Therapy with a medical diagnosis of lymphedema. This patient presents s/p recent office visit with referring provider due to worsening pain and swelling of left lower extremity resulting in: lymphedema of the left lower extremity, increased pain, increased stiffness in the left lower extremity, as well as difficulty performing activities of daily " living and instrumental activities of daily living, and placing the pt at higher risk of infection.     Pt prognosis is Good.   Pt will benefit from skilled outpatient Occupational Therapy to address the deficits stated above and in the chart below, provide pt/family education, and to maximize pt's level of independence.     Plan of care discussed with patient: Yes  Pt's spiritual, cultural and educational needs considered and patient is agreeable to the plan of care and goals as stated below:     Anticipated Barriers for therapy: compliance with therapy attendance, compliance with compression wraps, compliance with home exercise program       Decision Making/ Complexity Score: low     The following goals were discussed with the patient and patient is in agreement with them as to be addressed in the treatment plan.     Short Term Goals: (6 weeks)  1. Patient will show decreased girth in left LE by up to 2 cm to allow for LE symmetry, shoe and clothing choice, and ability to apply needed compression.  (progressing, not met)   2. Patient will demonstrate 100% knowledge of lymphedema precautions and signs of infection to allow for reduced lymphedema risk, infection risk, and/or exacerbation of condition.  (progressing, not met)  3. Patient or caregiver will perform self-bandaging techniques and/or wearing of compression garments to allow for lymphatic drainage support, skin elasticity, and reduction in shape and size of limb. (progressing, not met)  4. Patient will perform self lymph drainage techniques to areas within reach to enhance lymphatic drainage and skin condition.  (progressing, not met)  5. Patient will tolerate daily activities with multilayered bandaging to allow for lymphatic and venous support.  (progressing, not met)    Long Term Goals: (12  weeks)  1. Patient will show decreased girth in left LE by up to 4 cm  to allow for LE symmetry, shoe and clothing choice, and ability to apply needed compression  daily.  (progressing, not met)  2. Patient will show reduction in density to mild or less with improved contour of limb to allow for cosmesis, LE symmetry, infection risk reduction, and clothing and compression choice.   (progressing, not met)  3. Patient to jann/doff compression garment with daily compliance to assist in lymphedema management, skin elasticity, and tissue density.  (progressing, not met)  4. Pt to show improved postural awareness and alignment.  (progressing, not met)  5. Pt to be I and compliant with HEP to allow for increased function in affected limb.   (progressing, not met)    Will reassess goals as necessary.    Plan   Plan of care Certification: 5/4/2022 to 7/29/2022.    Outpatient Occupational Therapy 1 times weekly for 12 weeks to include the following interventions: Manual Therapy, Patient Education, Self Care, Therapeutic Activities and Therapeutic Exercise. Complete Decongestive Therapy- compression and home equipment needs to be addressed and assisted.      Shiloh Baxter, OTR/L, CLT   5/4/2022

## 2022-05-11 ENCOUNTER — CLINICAL SUPPORT (OUTPATIENT)
Dept: REHABILITATION | Facility: HOSPITAL | Age: 37
End: 2022-05-11
Payer: COMMERCIAL

## 2022-05-11 DIAGNOSIS — I89.0 LYMPHEDEMA: Primary | ICD-10-CM

## 2022-05-11 PROCEDURE — 97140 MANUAL THERAPY 1/> REGIONS: CPT

## 2022-05-11 NOTE — PROGRESS NOTES
[facility]  Occupational Therapy Treatment Note    Date: 5/11/2022  Name: Mercedes Saldivar  Clinic Number: [id number]    Therapy Diagnosis: No diagnosis found.  Physician: Nigel Da Silva DO    Physician Orders: Eval and treat LLE lymphedema with MLD and compression   Medical Diagnosis: Lymphedema   Evaluation Date: 5/4/2022  Insurance Authorization Period Expiration: 12/31/2022  Plan of Care Expiration: 7/29/2022  Progress Note Due:  6/4/2022  Date of Return to MD:   Visit # / Visits authorized: 2 / 20 OT visits per calendar year due to pt with [employer] [place]          Precautions:  Standard    Time In: 3:05  Time Out: 3:50  Total Billable Time: 45 minutes    SUBJECTIVE     Pt reports: she removed the compression wrap Kumar morning, then wore compression hose Monday and Tuesday and then did not wear anything on left lower extremity today.   She was compliant with home exercise program given last session.   Response to previous treatment: responded well   Functional change: same as initial evaluation     Pain: 4/10  Location: left lower legs     OBJECTIVE     Objective Measures updated at progress report unless specified.    Treatment     Mercedes received the treatments listed below:       Manual therapy techniques: Manual Lymphatic Drainage were applied to the: left lower extremity for 45 minutes, including:  MLD to left lower extremity. Moisturizing lotion applied to left lower leg. Two layer compression wrap applied to left lower leg.     Therapeutic exercises to develop strength, endurance, ROM, flexibility and posture for 0 minutes, including:      Therapeutic activities to improve functional performance for 0  minutes, including:        Patient Education and Home Exercises      Education provided:   - precautions of compression wraps, as well as when to remove   - Progress towards goals          Assessment     Pt would continue to benefit from skilled OT.      Mercedes is progressing well towards her  goals and there are no updates to goals at this time. Pt prognosis is Good.     Pt will continue to benefit from skilled outpatient occupational therapy to address the deficits listed in the problem list on initial evaluation provide pt/family education and to maximize pt's level of independence in the home and community environment.     Pt's spiritual, cultural and educational needs considered and pt agreeable to plan of care and goals.    Anticipated barriers to occupational therapy: none     Goals:    Short Term Goals: (6 weeks)  1. Patient will show decreased girth in left LE by up to 2 cm to allow for LE symmetry, shoe and clothing choice, and ability to apply needed compression.  (progressing, not met)   2. Patient will demonstrate 100% knowledge of lymphedema precautions and signs of infection to allow for reduced lymphedema risk, infection risk, and/or exacerbation of condition.  (progressing, not met)  3. Patient or caregiver will perform self-bandaging techniques and/or wearing of compression garments to allow for lymphatic drainage support, skin elasticity, and reduction in shape and size of limb. (progressing, not met)  4. Patient will perform self lymph drainage techniques to areas within reach to enhance lymphatic drainage and skin condition.  (progressing, not met)  5. Patient will tolerate daily activities with multilayered bandaging to allow for lymphatic and venous support.  (progressing, not met)     Long Term Goals: (12  weeks)  1. Patient will show decreased girth in left LE by up to 4 cm  to allow for LE symmetry, shoe and clothing choice, and ability to apply needed compression daily.  (progressing, not met)  2. Patient will show reduction in density to mild or less with improved contour of limb to allow for cosmesis, LE symmetry, infection risk reduction, and clothing and compression choice.   (progressing, not met)  3. Patient to jann/doff compression garment with daily compliance to assist  in lymphedema management, skin elasticity, and tissue density.  (progressing, not met)  4. Pt to show improved postural awareness and alignment.  (progressing, not met)  5. Pt to be I and compliant with HEP to allow for increased function in affected limb.   (progressing, not met)     Will reassess goals as necessary.    PLAN     Continue Occupational Therapy plan of care.   Updates/Grading for next session: home exercise program       Shiloh Baxter, OTR/L, CLT   5/11/2022

## 2022-05-19 ENCOUNTER — OFFICE VISIT (OUTPATIENT)
Dept: VASCULAR SURGERY | Facility: CLINIC | Age: 37
End: 2022-05-19
Payer: COMMERCIAL

## 2022-05-19 VITALS
SYSTOLIC BLOOD PRESSURE: 110 MMHG | WEIGHT: 276.38 LBS | BODY MASS INDEX: 46.05 KG/M2 | DIASTOLIC BLOOD PRESSURE: 80 MMHG | RESPIRATION RATE: 18 BRPM | HEART RATE: 88 BPM | HEIGHT: 65 IN

## 2022-05-19 DIAGNOSIS — I87.2 LYMPHEDEMA DUE TO VENOUS INSUFFICIENCY: ICD-10-CM

## 2022-05-19 DIAGNOSIS — I89.0 LYMPHEDEMA DUE TO VENOUS INSUFFICIENCY: ICD-10-CM

## 2022-05-19 DIAGNOSIS — R60.0 EDEMA OF LEFT LOWER EXTREMITY: ICD-10-CM

## 2022-05-19 DIAGNOSIS — M79.605 PAIN OF LEFT LOWER EXTREMITY: ICD-10-CM

## 2022-05-19 DIAGNOSIS — I87.1 MAY-THURNER SYNDROME: Primary | ICD-10-CM

## 2022-05-19 PROCEDURE — 99214 PR OFFICE/OUTPT VISIT, EST, LEVL IV, 30-39 MIN: ICD-10-PCS | Mod: S$PBB,,, | Performed by: FAMILY MEDICINE

## 2022-05-19 PROCEDURE — 3074F PR MOST RECENT SYSTOLIC BLOOD PRESSURE < 130 MM HG: ICD-10-PCS | Mod: ,,, | Performed by: FAMILY MEDICINE

## 2022-05-19 PROCEDURE — 3079F DIAST BP 80-89 MM HG: CPT | Mod: ,,, | Performed by: FAMILY MEDICINE

## 2022-05-19 PROCEDURE — 3079F PR MOST RECENT DIASTOLIC BLOOD PRESSURE 80-89 MM HG: ICD-10-PCS | Mod: ,,, | Performed by: FAMILY MEDICINE

## 2022-05-19 PROCEDURE — 1160F PR REVIEW ALL MEDS BY PRESCRIBER/CLIN PHARMACIST DOCUMENTED: ICD-10-PCS | Mod: ,,, | Performed by: FAMILY MEDICINE

## 2022-05-19 PROCEDURE — 3074F SYST BP LT 130 MM HG: CPT | Mod: ,,, | Performed by: FAMILY MEDICINE

## 2022-05-19 PROCEDURE — 1160F RVW MEDS BY RX/DR IN RCRD: CPT | Mod: ,,, | Performed by: FAMILY MEDICINE

## 2022-05-19 PROCEDURE — 3008F PR BODY MASS INDEX (BMI) DOCUMENTED: ICD-10-PCS | Mod: ,,, | Performed by: FAMILY MEDICINE

## 2022-05-19 PROCEDURE — 99214 OFFICE O/P EST MOD 30 MIN: CPT | Mod: S$PBB,,, | Performed by: FAMILY MEDICINE

## 2022-05-19 PROCEDURE — 99214 OFFICE O/P EST MOD 30 MIN: CPT | Mod: PBBFAC | Performed by: FAMILY MEDICINE

## 2022-05-19 PROCEDURE — 1159F PR MEDICATION LIST DOCUMENTED IN MEDICAL RECORD: ICD-10-PCS | Mod: ,,, | Performed by: FAMILY MEDICINE

## 2022-05-19 PROCEDURE — 3008F BODY MASS INDEX DOCD: CPT | Mod: ,,, | Performed by: FAMILY MEDICINE

## 2022-05-19 PROCEDURE — 1159F MED LIST DOCD IN RCRD: CPT | Mod: ,,, | Performed by: FAMILY MEDICINE

## 2022-05-19 RX ORDER — ALPRAZOLAM 0.5 MG
0.5 TABLET ORAL 3 TIMES DAILY PRN
COMMUNITY
Start: 2022-05-18

## 2022-05-19 RX ORDER — TEMAZEPAM 15 MG/1
15 CAPSULE ORAL NIGHTLY
COMMUNITY
Start: 2022-05-18 | End: 2023-02-14 | Stop reason: DRUGHIGH

## 2022-05-19 RX ORDER — LISDEXAMFETAMINE DIMESYLATE 60 MG/1
60 CAPSULE ORAL DAILY
COMMUNITY
Start: 2022-05-18 | End: 2023-02-14 | Stop reason: CLARIF

## 2022-05-19 NOTE — PROGRESS NOTES
VEIN CENTER CLINIC NOTE    Patient ID: Mercedes Saldivar is a 37 y.o. female.    I. HISTORY     Chief Complaint:   Chief Complaint   Patient presents with    Leg Pain     Exam room 4.  C/O left leg hurting. Unable to tolerate lymphatic therapy. Last seen 08/06/2020, did not keep 6 months follow up appointment.        HPI: Mercedes Saldivar is a 37 y.o. female who presents today with complaints of increasing left lower extremity pain despite attempts at compression and therapy.  On last visit the patient was started on compression wraps of the left lower extremity and orders were written for outpatient lymphedema therapy.  The patient states she has tried to keep her wraps on, but cannot tolerate secondary to pain.  The same goes for physical therapy.  She states that she cannot tolerate therapy it seems to be making the situation worse.  The patient states she is now having trouble sleeping, standing and walking.    Patient originally presented 4/25/22 by self referral for evaluation of left leg pain with swelling.  Symptoms are as described in the chart below and began several  years ago.  Location is left lower leg. Symptoms are progressive and functionally impairing at the end of the day.  History of venous interventions includes Left GSV ablation in 2015 by Dr. Steven Llamas. Patient also has history of Left Iliac stents placement by Dr. Meyer at the [facility] in [place]. She reports she has had continued left leg pain ever since prior to and after her procedures in 2015. She report her leg symptoms improved somewhat for about 2 weeks after Vein procedures but her pain never completely resolved. She says her pain has progressively gotten worse and is now to the point that it is not tolerable. No open ulcers. No history of DVT.  No family history of venous disease.  She reports she has continued to wear 20-30 mmHg compression since 2015, she elevates her legs periodically throughout the day and performs calf pumping  exercises, as well as, elevates her legs periodically throughout the day.  History of Lt. GSV ablation in 2015 and placement of Iliac Stents x 4 by Dr. Meyer at the [facility] in 2016.    Left lower extremity complete venous reflux study performed 4/26/22 shows no evidence of DVT in the left lower extremity.  There is valvular incompetence with associated deep venous reflux of the left common femoral vein.  Left iliac stent appears patent.  I also shows a well ablated left great saphenous vein proximally.  Superficial venous reflux noted in the left distal great saphenous vein.  No reflux in the left small saphenous vein.      Venous Disease Medical Necessity Documentation Initial Visit Date: 4-26-22 Return Check Date:    1. Have you ever had a rupture or bleed from a varicose vein in your leg(s)?              [] Yes  [x] No   [] Yes   [] No   2. Have you ever been diagnosed with phlebitis, cellulitis, or inflammation in the area of the varicose veins of  your leg(s)?  [] Yes  [x] No    [] Yes   [] No   3. Do you have darkened or inflamed skin on your legs?   [] Yes   [x] No   [] Yes   [] No   4. Do you have leg swelling?     [x] Yes   [] No   [] Yes   [] No   5. Do you have leg pain?   [x] Yes   [] No   [] Yes   [] No   If yes, describe the type of pain?    []   Stabbing []  Radiating []  Aching   []  Tightness [x]  Throbbing               [x]  Burning []  Cramping ( +)  Nagging pain left leg              6. Do you have leg discomfort?   [x] Yes   [] No   [] Yes   [] No   If yes, describe the type of discomfort?    []  Heaviness [x]  Fullness   [x]  Restlessness [x] Tired/Fatigued [x] Itching              7. Have you ever worn compression hose?   [x] Yes   [] No   [] Yes   [] No   If yes, how long?    7 years       8. Do you elevate your legs while sitting?   [x] Yes   [] No   [] Yes   [] No   9. Does venous disease (varicose veins, ulcers, skin changes, leg pain/swelling) interfere with your daily life?  If yes,  check activities you are limited or unable to do.    [x]  Shower  [x]   Walk  [x]  Exercise  [] Play with children/grandchildren  []  Shop [] Work [x] Stand for any period of time [x] Sleep                               [] Sitting for an extended period of time.           [x] Yes   [] No   [] Yes   [] No   10. Do you exercise/have you tried to exercise (i.e.  Walk our participate in a regular exercise routine)?  [x] Yes, calf exercises and walks  [] No   [] Yes   [] No   11. BMI 45.4             Past Medical History:   Diagnosis Date    ADHD     Anxiety     Edema, lower extremity     Pain in left leg     Venous insufficiency         Past Surgical History:   Procedure Laterality Date    ABDOMINAL SURGERY  11/18/2020    Gastric sleeve performed by Dr Villarreal in [place]    APPENDECTOMY      EPIDURAL STEROID INJECTION INTO LUMBAR SPINE  2012    L4-5, in [place].    TONSILLECTOMY      VENOUS ABLATION Left 03/30/2015    GSV Ablation performed by Dr. Steven Llamas.       Social History     Tobacco Use   Smoking Status Never Smoker   Smokeless Tobacco Never Used         Current Outpatient Medications:     albuterol (PROVENTIL/VENTOLIN HFA) 90 mcg/actuation inhaler, , Disp: , Rfl:     ARIPiprazole (ABILIFY) 10 MG Tab, Abilify 10 mg tablet  Take 1 tablet every day by oral route., Disp: , Rfl:     busPIRone (BUSPAR) 10 MG tablet, Take 10 mg by mouth 2 (two) times daily., Disp: , Rfl:     CALCIUM CITRATE ORAL, Take by mouth., Disp: , Rfl:     cyanocobalamin, vitamin B-12, (VITAMIN B-12 ORAL), Take by mouth., Disp: , Rfl:     dextroamphetamine-amphetamine (ADDERALL) 20 mg tablet, Adderall 20 mg tablet  Take 1 tablet twice a day by oral route., Disp: , Rfl:     multivitamin capsule, Take 1 capsule by mouth once daily., Disp: , Rfl:     RESTORIL 15 mg Cap, 1 capsule  at bedtime, Disp: , Rfl:     thiamine HCl (VITAMIN B-1 ORAL), Take by mouth., Disp: , Rfl:     tiZANidine (ZANAFLEX) 4 MG tablet, TAKE 1 OR 2 TABLETS BY  MOUTH AT BEDTIME FOR MUSCLE SPASMS, Disp: 180 tablet, Rfl: 1    VYVANSE 60 mg capsule, Take 60 mg by mouth once daily., Disp: , Rfl:     XANAX 0.5 mg tablet, Take 0.5 mg by mouth 3 (three) times daily as needed., Disp: , Rfl:     Review of Systems   Constitutional: Negative for fatigue and fever.   Eyes: Negative for pain.   Respiratory: Negative for chest tightness and shortness of breath.    Cardiovascular: Positive for leg swelling. Negative for chest pain.   Gastrointestinal: Negative for abdominal pain.   Musculoskeletal: Positive for leg pain.        Chronic darker skin changes bilaterally   Neurological: Negative for syncope.   Psychiatric/Behavioral: Negative for sleep disturbance.        II. PHYSICAL EXAM     Physical Exam  Vitals reviewed.   Constitutional:       General: She is not in acute distress.     Appearance: She is obese.   HENT:      Head: Normocephalic.   Eyes:      Pupils: Pupils are equal, round, and reactive to light.   Cardiovascular:      Rate and Rhythm: Normal rate and regular rhythm.   Pulmonary:      Effort: Pulmonary effort is normal.   Abdominal:      Palpations: Abdomen is soft.   Musculoskeletal:         General: No swelling. Normal range of motion.      Cervical back: Normal range of motion and neck supple.      Left lower leg: Edema present.      Comments: Mild to moderate swelling left leg   Skin:     General: Skin is warm and dry.   Neurological:      Mental Status: She is alert and oriented to person, place, and time.         CEAP Classification  Clinical Signs: Class 3 - Edema  Etiologic Classification: Primary  Anatomic distribution: Deep  Pathophysiologic dysfunction: Reflux       Venous Clinical Severity Score  Pain:2=Daily, moderate activity limitation, occasional analgesics  Varicose Veins: 0=None  Venous Edema: 3=Morning edema above ankle and requiring activity change, elevation  Pigmentation: 0=None or focal, low intensity (tan)  Inflammation: 0=None  Induration:  0=None  Number of Active Ulcers: 0=0  Active Ulceration, Duration: 0=None  Active Ulcer Size: 0=None  Compressive Therapy: 3=Full compliance, stockings + elevation  Total Score: 8       III. 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 Pain of left lower extremity    3. Edema of left lower extremity    4. Lymphedema due to venous insufficiency      Assessment/Diagnosis and Plan:  Patient's left lower extremity symptoms have increased despite compression and lymphedema therapy.  Ordinarily, I would order a CT venogram with contrast to evaluate the pelvic vasculature.  But, due to the nationwide contrast shortage, I will refer the patient to Dr. Shah for further evaluation and he may make the judgment call between the need for CTV verses IVUS.  Continue compression, exercise and elevation as tolerated.  Follow-up with us in 6 weeks.      Nigel Da Silva, DO

## 2022-06-07 ENCOUNTER — OFFICE VISIT (OUTPATIENT)
Dept: CARDIOLOGY | Facility: CLINIC | Age: 37
End: 2022-06-07
Payer: COMMERCIAL

## 2022-06-07 VITALS
WEIGHT: 278 LBS | RESPIRATION RATE: 18 BRPM | HEART RATE: 98 BPM | SYSTOLIC BLOOD PRESSURE: 120 MMHG | BODY MASS INDEX: 46.32 KG/M2 | HEIGHT: 65 IN | DIASTOLIC BLOOD PRESSURE: 80 MMHG

## 2022-06-07 DIAGNOSIS — I87.1 MAY-THURNER SYNDROME: ICD-10-CM

## 2022-06-07 DIAGNOSIS — R60.9 EDEMA, UNSPECIFIED TYPE: Primary | ICD-10-CM

## 2022-06-07 DIAGNOSIS — R60.9 EDEMA, UNSPECIFIED TYPE: ICD-10-CM

## 2022-06-07 PROCEDURE — 3008F PR BODY MASS INDEX (BMI) DOCUMENTED: ICD-10-PCS | Mod: ,,, | Performed by: STUDENT IN AN ORGANIZED HEALTH CARE EDUCATION/TRAINING PROGRAM

## 2022-06-07 PROCEDURE — 3079F PR MOST RECENT DIASTOLIC BLOOD PRESSURE 80-89 MM HG: ICD-10-PCS | Mod: ,,, | Performed by: STUDENT IN AN ORGANIZED HEALTH CARE EDUCATION/TRAINING PROGRAM

## 2022-06-07 PROCEDURE — 93005 ELECTROCARDIOGRAM TRACING: CPT | Mod: PBBFAC | Performed by: STUDENT IN AN ORGANIZED HEALTH CARE EDUCATION/TRAINING PROGRAM

## 2022-06-07 PROCEDURE — 99204 OFFICE O/P NEW MOD 45 MIN: CPT | Mod: S$PBB,,, | Performed by: STUDENT IN AN ORGANIZED HEALTH CARE EDUCATION/TRAINING PROGRAM

## 2022-06-07 PROCEDURE — 1159F MED LIST DOCD IN RCRD: CPT | Mod: ,,, | Performed by: STUDENT IN AN ORGANIZED HEALTH CARE EDUCATION/TRAINING PROGRAM

## 2022-06-07 PROCEDURE — 3074F SYST BP LT 130 MM HG: CPT | Mod: ,,, | Performed by: STUDENT IN AN ORGANIZED HEALTH CARE EDUCATION/TRAINING PROGRAM

## 2022-06-07 PROCEDURE — 93010 EKG 12-LEAD: ICD-10-PCS | Mod: S$PBB,,, | Performed by: STUDENT IN AN ORGANIZED HEALTH CARE EDUCATION/TRAINING PROGRAM

## 2022-06-07 PROCEDURE — 3008F BODY MASS INDEX DOCD: CPT | Mod: ,,, | Performed by: STUDENT IN AN ORGANIZED HEALTH CARE EDUCATION/TRAINING PROGRAM

## 2022-06-07 PROCEDURE — 3074F PR MOST RECENT SYSTOLIC BLOOD PRESSURE < 130 MM HG: ICD-10-PCS | Mod: ,,, | Performed by: STUDENT IN AN ORGANIZED HEALTH CARE EDUCATION/TRAINING PROGRAM

## 2022-06-07 PROCEDURE — 93010 ELECTROCARDIOGRAM REPORT: CPT | Mod: S$PBB,,, | Performed by: STUDENT IN AN ORGANIZED HEALTH CARE EDUCATION/TRAINING PROGRAM

## 2022-06-07 PROCEDURE — 1159F PR MEDICATION LIST DOCUMENTED IN MEDICAL RECORD: ICD-10-PCS | Mod: ,,, | Performed by: STUDENT IN AN ORGANIZED HEALTH CARE EDUCATION/TRAINING PROGRAM

## 2022-06-07 PROCEDURE — 99204 PR OFFICE/OUTPT VISIT, NEW, LEVL IV, 45-59 MIN: ICD-10-PCS | Mod: S$PBB,,, | Performed by: STUDENT IN AN ORGANIZED HEALTH CARE EDUCATION/TRAINING PROGRAM

## 2022-06-07 PROCEDURE — 3079F DIAST BP 80-89 MM HG: CPT | Mod: ,,, | Performed by: STUDENT IN AN ORGANIZED HEALTH CARE EDUCATION/TRAINING PROGRAM

## 2022-06-07 PROCEDURE — 99214 OFFICE O/P EST MOD 30 MIN: CPT | Mod: PBBFAC | Performed by: STUDENT IN AN ORGANIZED HEALTH CARE EDUCATION/TRAINING PROGRAM

## 2022-06-07 NOTE — ASSESSMENT & PLAN NOTE
Hx of left iliac vein stent in 2016 at [facility] in [place]  Patient now with persistent pain and leg swelling  US venous doppler with patent stent and good flow  Can consider CTV to confirm patent iliac vein stent.

## 2022-06-07 NOTE — PROGRESS NOTES
PCP: Bandar Godinez MD    Referring Provider:     Subjective:   Merceeds Saldivar is a 37 y.o. female with hx of obesity, hx of left iliac vein stenting and left GSV ablation in 2015 who presents for evaluation of left lower extremity swelling and pain.     Patient continues to have discomfort and swelling despite conservative management with compression wraps. She has frequent pain that prevents physical therapy. Discomfort with walking, sleeping walking. No open ulcers. No history of DVT.  No family history of venous disease.  She reports she has continued to wear 20-30 mmHg compression since 2015, she elevates her legs periodically throughout the day and performs calf pumping exercises, as well as, elevates her legs periodically throughout the day.  History of Lt. GSV ablation in 2015 and placement of Iliac Stents x 4 by Dr. Meyer at [facility] in [facility].    Left lower extremity complete venous reflux study performed 4/26/22 shows no evidence of DVT in the left lower extremity.  There is valvular incompetence with associated deep venous reflux of the left common femoral vein.  Left iliac stent appears patent.      US left venous study 4/25/22  No evidence of DVT noted in the left lower extremity.  Valvular competence with deep venous reflux of the left common femoral vein.  Left iliac stent appears patent.  Well ablated proximal left great saphenous vein.  Reflux noted within the left distal great saphenous vein.      Lab Results   Component Value Date     06/12/2021    K 3.4 (L) 06/12/2021     06/12/2021    CO2 26 06/12/2021    BUN 14 06/12/2021    CREATININE 0.89 06/12/2021    CALCIUM 9.0 06/12/2021    ANIONGAP 13 06/12/2021    ESTGFRAFRICA 86 08/13/2020    EGFRNONAA 76 06/12/2021       No results found for: CHOL  No results found for: HDL  No results found for: LDLCALC  No results found for: TRIG  No results found for: CHOLHDL    Lab Results   Component Value Date    WBC 9.44 06/12/2021    HGB  "11.2 (L) 06/12/2021    HCT 34.9 (L) 06/12/2021    MCV 88.1 06/12/2021     06/12/2021           Review of Systems   Respiratory: Negative for cough and shortness of breath.    Cardiovascular: Positive for leg swelling. Negative for chest pain, palpitations, orthopnea, claudication and PND.         Objective:   /80   Pulse 98   Resp 18   Ht 5' 5" (1.651 m)   Wt 126.1 kg (278 lb)   BMI 46.26 kg/m²     Physical Exam  Constitutional:       Appearance: Normal appearance.   Cardiovascular:      Rate and Rhythm: Normal rate and regular rhythm.      Pulses: Normal pulses.      Heart sounds: Normal heart sounds.   Pulmonary:      Effort: Pulmonary effort is normal.      Breath sounds: Normal breath sounds.   Musculoskeletal:      Right lower leg: No edema.      Left lower leg: Edema present.   Neurological:      Mental Status: She is alert.           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 Edema, unspecified type  EKG 12-lead         Plan:   May-Thurner syndrome  Hx of left iliac vein stent in 2016 at [facility]  Patient now with persistent pain and leg swelling  US venous doppler with patent stent and good flow  Can consider CTV to confirm patent iliac vein stent.       "

## 2022-08-17 ENCOUNTER — HOSPITAL ENCOUNTER (EMERGENCY)
Facility: HOSPITAL | Age: 37
Discharge: HOME | End: 2022-08-18
Attending: EMERGENCY MEDICINE
Payer: COMMERCIAL

## 2022-08-17 VITALS
HEIGHT: 65 IN | RESPIRATION RATE: 18 BRPM | DIASTOLIC BLOOD PRESSURE: 78 MMHG | OXYGEN SATURATION: 100 % | WEIGHT: 275 LBS | BODY MASS INDEX: 45.82 KG/M2 | TEMPERATURE: 98 F | SYSTOLIC BLOOD PRESSURE: 136 MMHG | HEART RATE: 72 BPM

## 2022-08-17 DIAGNOSIS — M79.605 LEFT LEG PAIN: Primary | ICD-10-CM

## 2022-08-17 DIAGNOSIS — M79.89 PAIN AND SWELLING OF LOWER EXTREMITY, LEFT: ICD-10-CM

## 2022-08-17 DIAGNOSIS — M79.605 PAIN AND SWELLING OF LOWER EXTREMITY, LEFT: ICD-10-CM

## 2022-08-17 PROCEDURE — 99283 EMERGENCY DEPT VISIT LOW MDM: CPT | Mod: ,,, | Performed by: EMERGENCY MEDICINE

## 2022-08-17 PROCEDURE — 99284 EMERGENCY DEPT VISIT MOD MDM: CPT | Mod: 25

## 2022-08-17 PROCEDURE — 99283 PR EMERGENCY DEPT VISIT,LEVEL III: ICD-10-PCS | Mod: ,,, | Performed by: EMERGENCY MEDICINE

## 2022-08-17 PROCEDURE — 99285 EMERGENCY DEPT VISIT HI MDM: CPT | Mod: 25

## 2022-08-18 ENCOUNTER — HOSPITAL ENCOUNTER (EMERGENCY)
Facility: HOSPITAL | Age: 37
Discharge: HOME | End: 2022-08-18
Payer: COMMERCIAL

## 2022-08-18 VITALS
OXYGEN SATURATION: 98 % | BODY MASS INDEX: 47.82 KG/M2 | TEMPERATURE: 98 F | HEIGHT: 65 IN | WEIGHT: 287 LBS | DIASTOLIC BLOOD PRESSURE: 99 MMHG | HEART RATE: 92 BPM | RESPIRATION RATE: 20 BRPM | SYSTOLIC BLOOD PRESSURE: 145 MMHG

## 2022-08-18 DIAGNOSIS — S60.031A CONTUSION OF RIGHT MIDDLE FINGER WITHOUT DAMAGE TO NAIL, INITIAL ENCOUNTER: ICD-10-CM

## 2022-08-18 DIAGNOSIS — S60.021A CONTUSION OF RIGHT INDEX FINGER WITHOUT DAMAGE TO NAIL, INITIAL ENCOUNTER: Primary | ICD-10-CM

## 2022-08-18 DIAGNOSIS — S60.041A CONTUSION OF RIGHT RING FINGER WITHOUT DAMAGE TO NAIL, INITIAL ENCOUNTER: ICD-10-CM

## 2022-08-18 PROCEDURE — 99283 EMERGENCY DEPT VISIT LOW MDM: CPT

## 2022-08-18 PROCEDURE — 99282 EMERGENCY DEPT VISIT SF MDM: CPT | Mod: ,,, | Performed by: NURSE PRACTITIONER

## 2022-08-18 PROCEDURE — 99282 PR EMERGENCY DEPT VISIT,LEVEL II: ICD-10-PCS | Mod: ,,, | Performed by: NURSE PRACTITIONER

## 2022-08-18 RX ORDER — ONDANSETRON 8 MG/1
TABLET, ORALLY DISINTEGRATING ORAL
COMMUNITY
Start: 2022-05-29 | End: 2023-01-13 | Stop reason: ALTCHOICE

## 2022-08-18 NOTE — ED PROVIDER NOTES
Encounter Date: 8/17/2022       History     Chief Complaint   Patient presents with    Leg Pain     37-year-old female patient came to the emergency department complaining of left lower extremity pain and swelling.  The patient has history of venous insufficiency.  She states that 2 days ago she started having painful swelling in the left leg.  There is no shortness of breath.  There is no nausea or vomiting.  There is no chest pain.    The history is provided by the patient. No  was used.     Review of patient's allergies indicates:   Allergen Reactions    Adhesive tape-silicones     Hydrocodone-acetaminophen Itching    Latex, natural rubber      Past Medical History:   Diagnosis Date    ADHD     Anxiety     Edema, lower extremity     Pain in left leg     Venous insufficiency      Past Surgical History:   Procedure Laterality Date    ABDOMINAL SURGERY  11/18/2020    Gastric sleeve performed by Dr Villarreal in [place]    APPENDECTOMY      EPIDURAL STEROID INJECTION INTO LUMBAR SPINE  2012    L4-5, in [place].    TONSILLECTOMY      VENOUS ABLATION Left 03/30/2015    GSV Ablation performed by Dr. Steven Llamas.     Family History   Problem Relation Age of Onset    Hypertension Mother     Bipolar disorder Father     Hyperlipidemia Father     Hypertension Father     Scoliosis Brother      Social History     Tobacco Use    Smoking status: Never Smoker    Smokeless tobacco: Never Used   Substance Use Topics    Alcohol use: Yes    Drug use: Never     Review of Systems   Constitutional: Negative for fever.   HENT: Negative for sore throat.    Respiratory: Negative for shortness of breath.    Cardiovascular: Negative for chest pain.   Gastrointestinal: Negative for nausea.   Genitourinary: Negative for dysuria.   Musculoskeletal: Negative for back pain.   Skin: Negative for rash.   Neurological: Negative for weakness.   Hematological: Does not bruise/bleed easily.       Physical Exam      Initial Vitals [08/17/22 2141]   BP Pulse Resp Temp SpO2   136/78 72 18 98 °F (36.7 °C) 100 %      MAP       --         Physical Exam    Nursing note and vitals reviewed.  Constitutional: She appears well-developed and well-nourished.   HENT:   Head: Normocephalic and atraumatic.   Eyes: EOM are normal. Pupils are equal, round, and reactive to light.   Neck: Neck supple.   Normal range of motion.  Cardiovascular: Normal rate and regular rhythm.   No murmur heard.  Pulmonary/Chest: No respiratory distress.   Abdominal: Abdomen is soft. There is no abdominal tenderness.   Musculoskeletal:         General: Tenderness (Left foot) and edema present. Normal range of motion.      Cervical back: Normal range of motion and neck supple.     Neurological: She is alert and oriented to person, place, and time. GCS score is 15. GCS eye subscore is 4. GCS verbal subscore is 5. GCS motor subscore is 6.   Skin: Skin is warm and dry. Capillary refill takes less than 2 seconds.   Psychiatric: She has a normal mood and affect.         Medical Screening Exam   See Full Note    ED Course   Procedures  Labs Reviewed - No data to display       Imaging Results          US Lower Extremity Veins Left (In process)                  Medications - No data to display                    Clinical Impression:   Final diagnoses:  [M79.605, M79.89] Pain and swelling of lower extremity, left  [M79.605] Left leg pain (Primary)          ED Disposition Condition    Discharge Stable        ED Prescriptions     None        Follow-up Information     Follow up With Specialties Details Why Contact Info    Bandar Godinez MD Family Medicine In 3 days If symptoms worsen [address] [facility]  [facility] [place] [address]  [phone]             Washington Malik MD  08/18/22 0035

## 2022-08-18 NOTE — Clinical Note
"Summer "Summer" Janna was seen and treated in our emergency department on 8/18/2022.  She may return to work on 08/20/2022.       If you have any questions or concerns, please don't hesitate to call.      MELIZA Santos"

## 2022-08-18 NOTE — ED TRIAGE NOTES
Patient reports history of venous insufficiency and reports that she had a stent placed in [facility] in her left leg.  Patient developed a red spot on her lower left leg 2 days ago and is concerned for blood clot.

## 2022-08-19 ENCOUNTER — OFFICE VISIT (OUTPATIENT)
Dept: FAMILY MEDICINE | Facility: CLINIC | Age: 37
End: 2022-08-19
Payer: COMMERCIAL

## 2022-08-19 ENCOUNTER — TELEPHONE (OUTPATIENT)
Dept: EMERGENCY MEDICINE | Facility: HOSPITAL | Age: 37
End: 2022-08-19
Payer: COMMERCIAL

## 2022-08-19 VITALS — DIASTOLIC BLOOD PRESSURE: 85 MMHG | SYSTOLIC BLOOD PRESSURE: 131 MMHG | HEART RATE: 92 BPM

## 2022-08-19 DIAGNOSIS — M79.641 PAIN OF RIGHT HAND: Primary | ICD-10-CM

## 2022-08-19 PROCEDURE — 3075F SYST BP GE 130 - 139MM HG: CPT | Mod: ,,, | Performed by: FAMILY MEDICINE

## 2022-08-19 PROCEDURE — 3079F PR MOST RECENT DIASTOLIC BLOOD PRESSURE 80-89 MM HG: ICD-10-PCS | Mod: ,,, | Performed by: FAMILY MEDICINE

## 2022-08-19 PROCEDURE — 1159F PR MEDICATION LIST DOCUMENTED IN MEDICAL RECORD: ICD-10-PCS | Mod: ,,, | Performed by: FAMILY MEDICINE

## 2022-08-19 PROCEDURE — 1160F RVW MEDS BY RX/DR IN RCRD: CPT | Mod: ,,, | Performed by: FAMILY MEDICINE

## 2022-08-19 PROCEDURE — 99213 PR OFFICE/OUTPT VISIT, EST, LEVL III, 20-29 MIN: ICD-10-PCS | Mod: 25,,, | Performed by: FAMILY MEDICINE

## 2022-08-19 PROCEDURE — 3075F PR MOST RECENT SYSTOLIC BLOOD PRESS GE 130-139MM HG: ICD-10-PCS | Mod: ,,, | Performed by: FAMILY MEDICINE

## 2022-08-19 PROCEDURE — 1159F MED LIST DOCD IN RCRD: CPT | Mod: ,,, | Performed by: FAMILY MEDICINE

## 2022-08-19 PROCEDURE — 96372 THER/PROPH/DIAG INJ SC/IM: CPT | Mod: ,,, | Performed by: FAMILY MEDICINE

## 2022-08-19 PROCEDURE — 99213 OFFICE O/P EST LOW 20 MIN: CPT | Mod: 25,,, | Performed by: FAMILY MEDICINE

## 2022-08-19 PROCEDURE — 3079F DIAST BP 80-89 MM HG: CPT | Mod: ,,, | Performed by: FAMILY MEDICINE

## 2022-08-19 PROCEDURE — 96372 PR INJECTION,THERAP/PROPH/DIAG2ST, IM OR SUBCUT: ICD-10-PCS | Mod: ,,, | Performed by: FAMILY MEDICINE

## 2022-08-19 PROCEDURE — 1160F PR REVIEW ALL MEDS BY PRESCRIBER/CLIN PHARMACIST DOCUMENTED: ICD-10-PCS | Mod: ,,, | Performed by: FAMILY MEDICINE

## 2022-08-19 RX ORDER — KETOROLAC TROMETHAMINE 30 MG/ML
60 INJECTION, SOLUTION INTRAMUSCULAR; INTRAVENOUS
Status: COMPLETED | OUTPATIENT
Start: 2022-08-19 | End: 2022-08-19

## 2022-08-19 RX ORDER — TRAMADOL HYDROCHLORIDE 50 MG/1
50 TABLET ORAL EVERY 6 HOURS PRN
Qty: 20 TABLET | Refills: 0 | Status: SHIPPED | OUTPATIENT
Start: 2022-08-19 | End: 2023-02-14

## 2022-08-19 RX ADMIN — KETOROLAC TROMETHAMINE 60 MG: 30 INJECTION, SOLUTION INTRAMUSCULAR; INTRAVENOUS at 02:08

## 2022-08-19 NOTE — DISCHARGE INSTRUCTIONS
Use over the counter Ibuprofen/Tylenol as needed for pain, as discussed.  Ice to affected area as discussed.  It will help with swelling.  Use 20 mins on and 20 mins off. Return to the ER for new/worsening of symptoms.

## 2022-08-19 NOTE — PROGRESS NOTES
MD KAILEY Calvillo [facility]  MEDICAL [facility] [address] [address]  [phone]      PATIENT NAME: Mercedes Saldivar  : 1985  DATE: 22  MRN: [id number]      Billing Provider: Bandar Godinez MD  Level of Service:   Patient PCP Information       Provider PCP Type    Bandar Godinez MD General            Reason for Visit / Chief Complaint: Follow-up (Follow-up from ER for hand injury)       Update PCP  Update Chief Complaint         History of Present Illness / Problem Focused Workflow     Mercedes Saldivar presents to the clinic with Follow-up (Follow-up from ER for hand injury)       38 yo WF here for follow up from ED for injury to hand after striking a wall.  Had xray done and was placed in finger splints.  I have reviewed the ED note and imaging.  Per ED documentation, they placed a referral to ortho for ? fracture.      Review of Systems     Review of Systems   Musculoskeletal: Positive for arthralgias, joint swelling and joint deformity.        Medical / Social / Family History     Past Medical History:   Diagnosis Date    ADHD     Anxiety     Edema, lower extremity     Pain in left leg     Venous insufficiency        Past Surgical History:   Procedure Laterality Date    ABDOMINAL SURGERY  2020    Gastric sleeve performed by Dr Villarreal in [place]    APPENDECTOMY      EPIDURAL STEROID INJECTION INTO LUMBAR SPINE  2012    L4-5, in [place].    TONSILLECTOMY      VENOUS ABLATION Left 2015    GSV Ablation performed by Dr. Steven Llamas.       Social History    reports that she has never smoked. She has never used smokeless tobacco. She reports current alcohol use. She reports that she does not use drugs.   Social History     Tobacco Use    Smoking status: Never Smoker    Smokeless tobacco: Never Used   Substance Use Topics    Alcohol use: Yes     Comment: last drink was this evening at supper.    Drug use: Never        Family History  Family History   Problem Relation Age of Onset    Hypertension Mother     Bipolar disorder Father     Hyperlipidemia Father     Hypertension Father     Scoliosis Brother        Medications and Allergies     Medications  Outpatient Medications Marked as Taking for the 8/19/22 encounter (Office Visit) with Bandar Godinez MD   Medication Sig Dispense Refill    CALCIUM CITRATE ORAL Take by mouth.      cyanocobalamin, vitamin B-12, (VITAMIN B-12 ORAL) Take by mouth.      multivitamin capsule Take 1 capsule by mouth once daily.      ondansetron (ZOFRAN-ODT) 8 MG TbDL as needed.      RESTORIL 15 mg Cap 15 mg every evening.      thiamine HCl (VITAMIN B-1 ORAL) Take by mouth.      tiZANidine (ZANAFLEX) 4 MG tablet TAKE 1 OR 2 TABLETS BY MOUTH AT BEDTIME FOR MUSCLE SPASMS 180 tablet 1    VYVANSE 60 mg capsule Take 60 mg by mouth once daily.      XANAX 0.5 mg tablet Take 0.5 mg by mouth 3 (three) times daily as needed.         Allergies  Review of patient's allergies indicates:   Allergen Reactions    Adhesive tape-silicones     Hydrocodone-acetaminophen Itching    Latex, natural rubber        Physical Examination     Vitals:    08/19/22 1426   BP: 131/85   Pulse: 92     Physical Exam  Vitals reviewed.   Constitutional:       Appearance: Normal appearance.   HENT:      Head: Normocephalic and atraumatic.   Eyes:      Extraocular Movements: Extraocular movements intact.      Conjunctiva/sclera: Conjunctivae normal.      Pupils: Pupils are equal, round, and reactive to light.   Cardiovascular:      Rate and Rhythm: Normal rate and regular rhythm.      Heart sounds: Normal heart sounds.   Pulmonary:      Effort: Pulmonary effort is normal.      Breath sounds: Normal breath sounds.   Musculoskeletal:         General: Swelling and tenderness (R hand) present. Normal range of motion.      Cervical back: Normal range of motion.   Skin:     General: Skin is warm and dry.   Neurological:       General: No focal deficit present.      Mental Status: She is alert and oriented to person, place, and time.   Psychiatric:         Mood and Affect: Mood normal.         Behavior: Behavior normal.      X-Ray Hand 3 view Right  Narrative: EXAMINATION:  XR HAND COMPLETE 3 VIEW RIGHT    CLINICAL HISTORY:  right hand injury, right hand, right 2nd, 3rd and 4th digits.;    COMPARISON:  None available    FINDINGS:  No evidence of fracture seen.  The alignment of the joints appears normal.  No degenerative change is present.  No soft tissue abnormality is seen.  Impression: No evidence of abnormality demonstrated    Electronically signed by: Rusty Edwards  Date:    08/19/2022  Time:    07:55        Assessment and Plan (including Health Maintenance)      Problem List  Smart Sets  Document Outside HM   :    Plan: follow up with orthoif they think she needs to be seen, otherwise, RTC prn        Health Maintenance Due   Topic Date Due    Hepatitis C Screening  Never done    Cervical Cancer Screening  Never done    Lipid Panel  Never done    COVID-19 Vaccine (1) Never done    HIV Screening  Never done    TETANUS VACCINE  Never done       Problem List Items Addressed This Visit    None     Visit Diagnoses     Pain of right hand    -  Primary    Relevant Medications    ketorolac injection 60 mg (Completed)    traMADoL (ULTRAM) 50 mg tablet          Health Maintenance Topics with due status: Not Due       Topic Last Completion Date    Influenza Vaccine 10/12/2020       No future appointments.         Signature:  MD KAILEY Calv[facility]  MEDICAL GROUP [facility]  [address] [place] [address] [place] [address]  [phone]    Date of encounter: 8/19/22

## 2022-08-19 NOTE — ED PROVIDER NOTES
Encounter Date: 8/18/2022       History     Chief Complaint   Patient presents with    Hand Injury     Started tonight after punching a wall with right hand.      37 year old  female presents to the ER for right hand pain/injury and right 2nd, 3rd and 4th finger injuries.  Pt reports she was mad at her new 's ex-wife, so she punched a wall several times.      The history is provided by the patient.   Hand Injury   The incident occurred just prior to arrival. The incident occurred at home. Injury mechanism: punched a wall several times. The pain is present in the right hand and right fingers. The pain is at a severity of 8/10. The pain has been constant since the incident. Pertinent negatives include no fever and no malaise/fatigue. She reports no foreign bodies present. The symptoms are aggravated by movement, palpation and use. She has tried nothing for the symptoms.     Review of patient's allergies indicates:   Allergen Reactions    Adhesive tape-silicones     Hydrocodone-acetaminophen Itching    Latex, natural rubber      Past Medical History:   Diagnosis Date    ADHD     Anxiety     Edema, lower extremity     Pain in left leg     Venous insufficiency      Past Surgical History:   Procedure Laterality Date    ABDOMINAL SURGERY  11/18/2020    Gastric sleeve performed by Dr Villarreal in [place]    APPENDECTOMY      EPIDURAL STEROID INJECTION INTO LUMBAR SPINE  2012    L4-5, in [place].    TONSILLECTOMY      VENOUS ABLATION Left 03/30/2015    GSV Ablation performed by Dr. Steven Llamas.     Family History   Problem Relation Age of Onset    Hypertension Mother     Bipolar disorder Father     Hyperlipidemia Father     Hypertension Father     Scoliosis Brother      Social History     Tobacco Use    Smoking status: Never Smoker    Smokeless tobacco: Never Used   Substance Use Topics    Alcohol use: Yes     Comment: last drink was this evening at supper.    Drug use: Never     Review of  Systems   Constitutional: Negative for fever and malaise/fatigue.   HENT: Negative for sore throat.    Respiratory: Negative for shortness of breath.    Cardiovascular: Negative for chest pain.   Gastrointestinal: Negative for nausea.   Genitourinary: Negative for dysuria.   Musculoskeletal: Negative for back pain.   Skin: Negative for rash.   Neurological: Negative for weakness.   Hematological: Does not bruise/bleed easily.       Physical Exam     Initial Vitals [08/18/22 2203]   BP Pulse Resp Temp SpO2   (!) 145/99 92 20 98 °F (36.7 °C) 98 %      MAP       --         Physical Exam    Nursing note and vitals reviewed.  Constitutional: She appears well-developed and well-nourished. She is not diaphoretic. She is Obese . She is cooperative.  Non-toxic appearance. No distress.   HENT:   Head: Normocephalic and atraumatic.   Eyes: Pupils are equal, round, and reactive to light.   Neck: Neck supple.   Cardiovascular: Normal rate, regular rhythm, normal heart sounds and intact distal pulses. Exam reveals no gallop and no friction rub.    No murmur heard.  Pulmonary/Chest: Breath sounds normal. No respiratory distress. She has no wheezes. She has no rhonchi. She has no rales. She exhibits no tenderness.   Musculoskeletal:      Right wrist: Normal. No swelling, tenderness or bony tenderness.      Right hand: Tenderness and bony tenderness present. No swelling. Normal range of motion.      Cervical back: Neck supple.      Comments: Tenderness over 2-5th right metacarpals without swelling, deformity or ecchymosis.  Tenderness, swelling and ecchymosis over right 2nd and 3rd fingers.  Tenderness over right 4th digit without swelling or ecchymosis. Right thumb and 5th finger without problems.     Neurological: She is alert and oriented to person, place, and time.   Skin: Skin is warm and dry. Capillary refill takes less than 2 seconds.   Psychiatric: Her mood appears anxious. She is not withdrawn. Cognition and memory are  normal. Cognition and memory are not impaired. She exhibits normal recent memory and normal remote memory.   Anxious tearful and apologetic to staff members.         Medical Screening Exam   See Full Note    ED Course   Procedures  Labs Reviewed - No data to display       Imaging Results          X-Ray Hand 3 view Right (Preliminary result)  Result time 08/18/22 22:43:00    ED Interpretation by MELIZA Santos (08/18/22 22:43:00, [facility] - Emergency Department, Emergency Medicine)    No acute fracture per VR                               Medications - No data to display              ED Course as of 08/18/22 2246   Thu Aug 18, 2022   2243 Virtual radiology read the xray as no fracture or dislocation. However, I can appreciate a possible small fracture to the distal phalanx of the right 4th digit.  I discussed the concern with the patient.  Due to the concern for possible fracture, the pain, swelling and ecchymosis, will splint the fingers.  I will give ortho referral. [AG]      ED Course User Index  [AG] MELIZA Santos          Clinical Impression:   Final diagnoses:  [S60.021A] Contusion of right index finger without damage to nail, initial encounter (Primary)  [S60.031A] Contusion of right middle finger without damage to nail, initial encounter  [S60.041A] Contusion of right ring finger without damage to nail, initial encounter          ED Disposition Condition    Discharge Stable        ED Prescriptions     None        Follow-up Information     Follow up With Specialties Details Why Contact Info    Bandar Godinez MD Family Medicine Schedule an appointment as soon as possible for a visit in 1 week As needed, If symptoms worsen [address] [facility]  [facility] [place] [address]  [phone]             MELIZA Santos  08/18/22 2244

## 2022-08-23 DIAGNOSIS — M79.641 RIGHT HAND PAIN: Primary | ICD-10-CM

## 2023-01-10 ENCOUNTER — OFFICE VISIT (OUTPATIENT)
Dept: FAMILY MEDICINE | Facility: CLINIC | Age: 38
End: 2023-01-10
Payer: COMMERCIAL

## 2023-01-10 VITALS
SYSTOLIC BLOOD PRESSURE: 138 MMHG | WEIGHT: 287 LBS | DIASTOLIC BLOOD PRESSURE: 89 MMHG | BODY MASS INDEX: 47.82 KG/M2 | HEIGHT: 65 IN | HEART RATE: 93 BPM

## 2023-01-10 DIAGNOSIS — R05.8 COUGH WITH EXPOSURE TO SEVERE ACUTE RESPIRATORY SYNDROME CORONAVIRUS 2 (SARS-COV-2): ICD-10-CM

## 2023-01-10 DIAGNOSIS — J40 BRONCHITIS: Primary | ICD-10-CM

## 2023-01-10 DIAGNOSIS — F50.89 PICA: Chronic | ICD-10-CM

## 2023-01-10 DIAGNOSIS — M62.838 MUSCLE SPASM OF BOTH LOWER LEGS: Chronic | ICD-10-CM

## 2023-01-10 DIAGNOSIS — R05.1 ACUTE COUGH: ICD-10-CM

## 2023-01-10 DIAGNOSIS — D50.9 IRON DEFICIENCY ANEMIA, UNSPECIFIED IRON DEFICIENCY ANEMIA TYPE: Chronic | ICD-10-CM

## 2023-01-10 DIAGNOSIS — Z13.0 SCREENING, ANEMIA, DEFICIENCY, IRON: ICD-10-CM

## 2023-01-10 DIAGNOSIS — Z20.822 COUGH WITH EXPOSURE TO SEVERE ACUTE RESPIRATORY SYNDROME CORONAVIRUS 2 (SARS-COV-2): ICD-10-CM

## 2023-01-10 LAB
CTP QC/QA: YES
FLUAV AG NPH QL: NEGATIVE
FLUBV AG NPH QL: NEGATIVE
SARS-COV-2 AG RESP QL IA.RAPID: NEGATIVE

## 2023-01-10 PROCEDURE — 1160F RVW MEDS BY RX/DR IN RCRD: CPT | Mod: ,,, | Performed by: FAMILY MEDICINE

## 2023-01-10 PROCEDURE — 85025 COMPLETE CBC W/AUTO DIFF WBC: CPT | Mod: ,,, | Performed by: CLINICAL MEDICAL LABORATORY

## 2023-01-10 PROCEDURE — 87428 SARSCOV & INF VIR A&B AG IA: CPT | Mod: QW,,, | Performed by: FAMILY MEDICINE

## 2023-01-10 PROCEDURE — 85025 CBC WITH DIFFERENTIAL: ICD-10-PCS | Mod: ,,, | Performed by: CLINICAL MEDICAL LABORATORY

## 2023-01-10 PROCEDURE — 83540 ASSAY OF IRON: CPT | Mod: ,,, | Performed by: CLINICAL MEDICAL LABORATORY

## 2023-01-10 PROCEDURE — 3008F PR BODY MASS INDEX (BMI) DOCUMENTED: ICD-10-PCS | Mod: ,,, | Performed by: FAMILY MEDICINE

## 2023-01-10 PROCEDURE — 87428 POCT SARS-COV2 (COVID) WITH FLU ANTIGEN: ICD-10-PCS | Mod: QW,,, | Performed by: FAMILY MEDICINE

## 2023-01-10 PROCEDURE — 99214 OFFICE O/P EST MOD 30 MIN: CPT | Mod: ,,, | Performed by: FAMILY MEDICINE

## 2023-01-10 PROCEDURE — 3075F PR MOST RECENT SYSTOLIC BLOOD PRESS GE 130-139MM HG: ICD-10-PCS | Mod: ,,, | Performed by: FAMILY MEDICINE

## 2023-01-10 PROCEDURE — 3075F SYST BP GE 130 - 139MM HG: CPT | Mod: ,,, | Performed by: FAMILY MEDICINE

## 2023-01-10 PROCEDURE — 83550 IRON BINDING TEST: CPT | Mod: ,,, | Performed by: CLINICAL MEDICAL LABORATORY

## 2023-01-10 PROCEDURE — 3079F DIAST BP 80-89 MM HG: CPT | Mod: ,,, | Performed by: FAMILY MEDICINE

## 2023-01-10 PROCEDURE — 1159F MED LIST DOCD IN RCRD: CPT | Mod: ,,, | Performed by: FAMILY MEDICINE

## 2023-01-10 PROCEDURE — 83540 IRON AND TIBC: ICD-10-PCS | Mod: ,,, | Performed by: CLINICAL MEDICAL LABORATORY

## 2023-01-10 PROCEDURE — 3079F PR MOST RECENT DIASTOLIC BLOOD PRESSURE 80-89 MM HG: ICD-10-PCS | Mod: ,,, | Performed by: FAMILY MEDICINE

## 2023-01-10 PROCEDURE — 1160F PR REVIEW ALL MEDS BY PRESCRIBER/CLIN PHARMACIST DOCUMENTED: ICD-10-PCS | Mod: ,,, | Performed by: FAMILY MEDICINE

## 2023-01-10 PROCEDURE — 83550 IRON AND TIBC: ICD-10-PCS | Mod: ,,, | Performed by: CLINICAL MEDICAL LABORATORY

## 2023-01-10 PROCEDURE — 1159F PR MEDICATION LIST DOCUMENTED IN MEDICAL RECORD: ICD-10-PCS | Mod: ,,, | Performed by: FAMILY MEDICINE

## 2023-01-10 PROCEDURE — 3008F BODY MASS INDEX DOCD: CPT | Mod: ,,, | Performed by: FAMILY MEDICINE

## 2023-01-10 PROCEDURE — 99214 PR OFFICE/OUTPT VISIT, EST, LEVL IV, 30-39 MIN: ICD-10-PCS | Mod: ,,, | Performed by: FAMILY MEDICINE

## 2023-01-10 RX ORDER — TIZANIDINE 4 MG/1
TABLET ORAL
Qty: 60 TABLET | Refills: 2 | Status: SHIPPED | OUTPATIENT
Start: 2023-01-10

## 2023-01-10 RX ORDER — DEXAMETHASONE 0.75 MG/1
TABLET ORAL
Qty: 15 TABLET | Refills: 0 | Status: SHIPPED | OUTPATIENT
Start: 2023-01-10 | End: 2023-02-14 | Stop reason: ALTCHOICE

## 2023-01-10 RX ORDER — PROMETHAZINE HYDROCHLORIDE AND DEXTROMETHORPHAN HYDROBROMIDE 6.25; 15 MG/5ML; MG/5ML
5 SYRUP ORAL EVERY 4 HOURS PRN
Qty: 180 ML | Refills: 0 | Status: SHIPPED | OUTPATIENT
Start: 2023-01-10 | End: 2023-01-13 | Stop reason: SDUPTHER

## 2023-01-10 NOTE — PROGRESS NOTES
Bandar Godinez MD   [facility]  MEDICAL GROUP [address] [address]  [phone]      PATIENT NAME: Mercedes Jensen  : 1985  DATE: 1/10/23  MRN: [id number]      Billing Provider: Bandar Godinez MD  Level of Service: MI OFFICE/OUTPT VISIT, EST, LEVL IV, 30-39 MIN  Patient PCP Information       Provider PCP Type    Bandar Godinez MD General            Reason for Visit / Chief Complaint: Cough (X 5 days)       Update PCP  Update Chief Complaint         History of Present Illness / Problem Focused Workflow     Mercedes Jensen presents to the clinic with Cough (X 5 days)       Cough and wheezing x 5 days.  Took mucinex but did not help much.  Had a little bit of phenergan DM, which she states did help some.    Needs rx for zanaflex refilled.  Takes at night for leg cramps.  Reports craving chewing ice.  Is worried about anemia.  Does not report any other forms of pica.    Review of Systems     Review of Systems   Constitutional:  Negative for activity change, chills and fever.   HENT:  Negative for sore throat.    Eyes:  Negative for pain.   Respiratory:  Positive for cough and wheezing. Negative for chest tightness and shortness of breath.    Cardiovascular:  Negative for chest pain and palpitations.   Gastrointestinal:  Negative for abdominal pain.   Neurological:  Negative for dizziness, syncope and weakness.   Psychiatric/Behavioral:  Negative for confusion.       Medical / Social / Family History     Past Medical History:   Diagnosis Date    ADHD     Anxiety     Edema, lower extremity     Pain in left leg     Venous insufficiency        Past Surgical History:   Procedure Laterality Date    ABDOMINAL SURGERY  2020    Gastric sleeve performed by Dr Villarreal in [place]    APPENDECTOMY      EPIDURAL STEROID INJECTION INTO LUMBAR SPINE  2012    L4-5, in [place].    TONSILLECTOMY      VENOUS ABLATION Left 2015     GSV Ablation performed by Dr. Steven Llamas.       Social History    reports that she has never smoked. She has never used smokeless tobacco. She reports current alcohol use. She reports that she does not use drugs.   Social History     Tobacco Use    Smoking status: Never    Smokeless tobacco: Never   Substance Use Topics    Alcohol use: Yes     Comment: last drink was this evening at supper.    Drug use: Never       Family History  Family History   Problem Relation Age of Onset    Hypertension Mother     Bipolar disorder Father     Hyperlipidemia Father     Hypertension Father     Scoliosis Brother        Medications and Allergies     Medications  No outpatient medications have been marked as taking for the 1/10/23 encounter (Office Visit) with Bandar Godinez MD.       Allergies  Review of patient's allergies indicates:   Allergen Reactions    Adhesive tape-silicones     Hydrocodone-acetaminophen Itching    Latex, natural rubber        Physical Examination     Vitals:    01/10/23 1603   BP: 138/89   Pulse: 93     Physical Exam  Vitals reviewed.   Constitutional:       General: She is not in acute distress.     Appearance: Normal appearance.   HENT:      Head: Normocephalic and atraumatic.   Eyes:      Extraocular Movements: Extraocular movements intact.      Conjunctiva/sclera: Conjunctivae normal.      Pupils: Pupils are equal, round, and reactive to light.   Cardiovascular:      Rate and Rhythm: Normal rate and regular rhythm.      Heart sounds: Normal heart sounds.   Pulmonary:      Effort: Pulmonary effort is normal. No respiratory distress.      Breath sounds: Normal breath sounds. No stridor. No wheezing, rhonchi or rales.   Musculoskeletal:         General: Normal range of motion.      Cervical back: Normal range of motion.   Skin:     General: Skin is warm and dry.   Neurological:      General: No focal deficit present.      Mental Status: She is alert and oriented to person, place, and time.   Psychiatric:          Mood and Affect: Mood normal.         Behavior: Behavior normal.      Office Visit on 01/10/2023   Component Date Value Ref Range Status    SARS Coronavirus 2 Antigen 01/10/2023 Negative  Negative Final    Rapid Influenza A Ag 01/10/2023 Negative  Negative Final    Rapid Influenza B Ag 01/10/2023 Negative  Negative Final     Acceptable 01/10/2023 Yes   Final     Office Visit on 01/10/2023   Component Date Value Ref Range Status    SARS Coronavirus 2 Antigen 01/10/2023 Negative  Negative Final    Rapid Influenza A Ag 01/10/2023 Negative  Negative Final    Rapid Influenza B Ag 01/10/2023 Negative  Negative Final     Acceptable 01/10/2023 Yes   Final    Iron 01/10/2023 11 (L)  50 - 170 µg/dL Final    Iron Saturation 01/10/2023 3 (L)  14 - 50 % Final    TIBC 01/10/2023 435  250 - 450 µg/dL Final    WBC 01/10/2023 5.97  4.50 - 11.00 K/uL Final    RBC 01/10/2023 3.87 (L)  4.20 - 5.40 M/uL Final    Hemoglobin 01/10/2023 8.8 (L)  12.0 - 16.0 g/dL Final    Hematocrit 01/10/2023 30.4 (L)  38.0 - 47.0 % Final    MCV 01/10/2023 78.6 (L)  80.0 - 96.0 fL Final    MCH 01/10/2023 22.7 (L)  27.0 - 31.0 pg Final    MCHC 01/10/2023 28.9 (L)  32.0 - 36.0 g/dL Final    RDW 01/10/2023 18.5 (H)  11.5 - 14.5 % Final    Platelet Count 01/10/2023 423 (H)  150 - 400 K/uL Final    MPV 01/10/2023 9.9  9.4 - 12.4 fL Final    Neutrophils % 01/10/2023 48.1 (L)  53.0 - 65.0 % Final    Lymphocytes % 01/10/2023 39.5  27.0 - 41.0 % Final    Monocytes % 01/10/2023 7.0 (H)  2.0 - 6.0 % Final    Eosinophils % 01/10/2023 5.2 (H)  1.0 - 4.0 % Final    Basophils % 01/10/2023 0.2  0.0 - 1.0 % Final    Immature Granulocytes % 01/10/2023 0.0  0.0 - 0.4 % Final    nRBC, Auto 01/10/2023 0.0  <=0.0 % Final    Neutrophils, Abs 01/10/2023 2.87  1.80 - 7.70 K/uL Final    Lymphocytes, Absolute 01/10/2023 2.36  1.00 - 4.80 K/uL Final    Monocytes, Absolute 01/10/2023 0.42  0.00 - 0.80 K/uL Final    Eosinophils, Absolute 01/10/2023  0.31  0.00 - 0.50 K/uL Final    Basophils, Absolute 01/10/2023 0.01  0.00 - 0.20 K/uL Final    Immature Granulocytes, Absolute 01/10/2023 0.00  0.00 - 0.04 K/uL Final    nRBC, Absolute 01/10/2023 0.00  <=0.00 x10e3/uL Final    Diff Type 01/10/2023 Auto   Final         Assessment and Plan (including Health Maintenance)      Problem List  Smart Sets  Document Outside HM   :    Plan:         Health Maintenance Due   Topic Date Due    Hepatitis C Screening  Never done    Cervical Cancer Screening  Never done    Lipid Panel  Never done    HIV Screening  Never done    TETANUS VACCINE  Never done    Hemoglobin A1c (Diabetic Prevention Screening)  Never done    COVID-19 Vaccine (3 - Booster for Moderna series) 05/07/2021    Influenza Vaccine (1) 09/01/2022       Problem List Items Addressed This Visit    None  Visit Diagnoses       Bronchitis    -  Primary    Relevant Medications    dexAMETHasone (DECADRON) 0.75 MG Tab    Cough with exposure to severe acute respiratory syndrome coronavirus 2 (SARS-CoV-2)        Relevant Orders    POCT SARS-COV2 (COVID) with Flu Antigen (Completed)    Screening, anemia, deficiency, iron        Relevant Orders    CBC Auto Differential    Iron and TIBC    Acute cough        Relevant Medications    promethazine-dextromethorphan (PROMETHAZINE-DM) 6.25-15 mg/5 mL Syrp    Muscle spasm of both lower legs  (Chronic)       Relevant Medications    tiZANidine (ZANAFLEX) 4 MG tablet    Pica  (Chronic)               The patient has no Health Maintenance topics of status Not Due    No future appointments.         Signature:  Bandar Godinez MD  [facility]  MEDICAL GROUP [facility]  [address] [place] [address] [place] [address]  [phone]    Date of encounter: 1/10/23

## 2023-01-11 LAB
BASOPHILS # BLD AUTO: 0.01 K/UL (ref 0–0.2)
BASOPHILS NFR BLD AUTO: 0.2 % (ref 0–1)
DIFFERENTIAL METHOD BLD: ABNORMAL
EOSINOPHIL # BLD AUTO: 0.31 K/UL (ref 0–0.5)
EOSINOPHIL NFR BLD AUTO: 5.2 % (ref 1–4)
ERYTHROCYTE [DISTWIDTH] IN BLOOD BY AUTOMATED COUNT: 18.5 % (ref 11.5–14.5)
HCT VFR BLD AUTO: 30.4 % (ref 38–47)
HGB BLD-MCNC: 8.8 G/DL (ref 12–16)
IMM GRANULOCYTES # BLD AUTO: 0 K/UL (ref 0–0.04)
IMM GRANULOCYTES NFR BLD: 0 % (ref 0–0.4)
IRON SATN MFR SERPL: 3 % (ref 14–50)
IRON SERPL-MCNC: 11 ΜG/DL (ref 50–170)
LYMPHOCYTES # BLD AUTO: 2.36 K/UL (ref 1–4.8)
LYMPHOCYTES NFR BLD AUTO: 39.5 % (ref 27–41)
MCH RBC QN AUTO: 22.7 PG (ref 27–31)
MCHC RBC AUTO-ENTMCNC: 28.9 G/DL (ref 32–36)
MCV RBC AUTO: 78.6 FL (ref 80–96)
MONOCYTES # BLD AUTO: 0.42 K/UL (ref 0–0.8)
MONOCYTES NFR BLD AUTO: 7 % (ref 2–6)
MPC BLD CALC-MCNC: 9.9 FL (ref 9.4–12.4)
NEUTROPHILS # BLD AUTO: 2.87 K/UL (ref 1.8–7.7)
NEUTROPHILS NFR BLD AUTO: 48.1 % (ref 53–65)
NRBC # BLD AUTO: 0 X10E3/UL
NRBC, AUTO (.00): 0 %
PLATELET # BLD AUTO: 423 K/UL (ref 150–400)
RBC # BLD AUTO: 3.87 M/UL (ref 4.2–5.4)
TIBC SERPL-MCNC: 435 ΜG/DL (ref 250–450)
WBC # BLD AUTO: 5.97 K/UL (ref 4.5–11)

## 2023-01-12 RX ORDER — IRON,CARBONYL/ASCORBIC ACID 100-250 MG
1 TABLET ORAL DAILY
Qty: 90 TABLET | Refills: 1 | Status: SHIPPED | OUTPATIENT
Start: 2023-01-12 | End: 2023-04-06 | Stop reason: SDUPTHER

## 2023-01-12 NOTE — PROGRESS NOTES
Spoke with patient about results and rx sent to pharmacy for iron supplementation.  She will follow up 2 mos with repeat cbc at that time

## 2023-01-13 DIAGNOSIS — R05.1 ACUTE COUGH: ICD-10-CM

## 2023-01-13 RX ORDER — ALBUTEROL SULFATE 90 UG/1
2 AEROSOL, METERED RESPIRATORY (INHALATION) EVERY 6 HOURS PRN
Qty: 18 G | Refills: 0 | Status: SHIPPED | OUTPATIENT
Start: 2023-01-13 | End: 2024-01-13

## 2023-01-13 RX ORDER — PROMETHAZINE HYDROCHLORIDE AND DEXTROMETHORPHAN HYDROBROMIDE 6.25; 15 MG/5ML; MG/5ML
5 SYRUP ORAL EVERY 4 HOURS PRN
Qty: 120 ML | Refills: 0 | Status: SHIPPED | OUTPATIENT
Start: 2023-01-13 | End: 2023-01-23

## 2023-01-16 ENCOUNTER — LAB VISIT (OUTPATIENT)
Dept: LAB | Facility: HOSPITAL | Age: 38
End: 2023-01-16
Attending: NURSE PRACTITIONER
Payer: COMMERCIAL

## 2023-01-16 ENCOUNTER — HOSPITAL ENCOUNTER (OUTPATIENT)
Dept: RADIOLOGY | Facility: HOSPITAL | Age: 38
Discharge: HOME | End: 2023-01-16
Attending: NURSE PRACTITIONER
Payer: COMMERCIAL

## 2023-01-16 DIAGNOSIS — R53.83 FATIGUE, UNSPECIFIED TYPE: Primary | ICD-10-CM

## 2023-01-16 DIAGNOSIS — R06.2 WHEEZING: ICD-10-CM

## 2023-01-16 DIAGNOSIS — Z13.29 SCREENING FOR ENDOCRINE DISORDER: ICD-10-CM

## 2023-01-16 LAB
ALBUMIN SERPL BCP-MCNC: 3.4 G/DL (ref 3.5–5)
ALBUMIN/GLOB SERPL: 0.9 {RATIO}
ALP SERPL-CCNC: 81 U/L (ref 37–98)
ALT SERPL W P-5'-P-CCNC: 24 U/L (ref 13–56)
ANION GAP SERPL CALCULATED.3IONS-SCNC: 9 MMOL/L (ref 7–16)
AST SERPL W P-5'-P-CCNC: 21 U/L (ref 15–37)
BASOPHILS # BLD AUTO: 0.03 K/UL (ref 0–0.2)
BASOPHILS NFR BLD AUTO: 0.3 % (ref 0–1)
BILIRUB SERPL-MCNC: 0.2 MG/DL (ref ?–1.2)
BUN SERPL-MCNC: 16 MG/DL (ref 7–18)
BUN/CREAT SERPL: 21 (ref 6–20)
CALCIUM SERPL-MCNC: 8.8 MG/DL (ref 8.5–10.1)
CHLORIDE SERPL-SCNC: 107 MMOL/L (ref 98–107)
CO2 SERPL-SCNC: 31 MMOL/L (ref 21–32)
CREAT SERPL-MCNC: 0.76 MG/DL (ref 0.55–1.02)
DIFFERENTIAL METHOD BLD: ABNORMAL
EGFR (NO RACE VARIABLE) (RUSH/TITUS): 104 ML/MIN/1.73M²
EOSINOPHIL # BLD AUTO: 0.51 K/UL (ref 0–0.5)
EOSINOPHIL NFR BLD AUTO: 5 % (ref 1–4)
ERYTHROCYTE [DISTWIDTH] IN BLOOD BY AUTOMATED COUNT: 18.6 % (ref 11.5–14.5)
GLOBULIN SER-MCNC: 3.7 G/DL (ref 2–4)
GLUCOSE SERPL-MCNC: 91 MG/DL (ref 74–106)
HCT VFR BLD AUTO: 30.2 % (ref 38–47)
HGB BLD-MCNC: 9.3 G/DL (ref 12–16)
LYMPHOCYTES # BLD AUTO: 2.24 K/UL (ref 1–4.8)
LYMPHOCYTES NFR BLD AUTO: 21.8 % (ref 27–41)
MCH RBC QN AUTO: 23.4 PG (ref 27–31)
MCHC RBC AUTO-ENTMCNC: 30.8 G/DL (ref 32–36)
MCV RBC AUTO: 75.9 FL (ref 80–96)
MONOCYTES # BLD AUTO: 0.81 K/UL (ref 0–0.8)
MONOCYTES NFR BLD AUTO: 7.9 % (ref 2–6)
MPC BLD CALC-MCNC: 9.2 FL (ref 9.4–12.4)
NEUTROPHILS # BLD AUTO: 6.69 K/UL (ref 1.8–7.7)
NEUTROPHILS NFR BLD AUTO: 65 % (ref 53–65)
PLATELET # BLD AUTO: 526 K/UL (ref 150–400)
POTASSIUM SERPL-SCNC: 4.1 MMOL/L (ref 3.5–5.1)
PROT SERPL-MCNC: 7.1 G/DL (ref 6.4–8.2)
RBC # BLD AUTO: 3.98 M/UL (ref 4.2–5.4)
SODIUM SERPL-SCNC: 143 MMOL/L (ref 136–145)
WBC # BLD AUTO: 10.28 K/UL (ref 4.5–11)

## 2023-01-16 PROCEDURE — 80053 COMPREHEN METABOLIC PANEL: CPT

## 2023-01-16 PROCEDURE — 84443 ASSAY THYROID STIM HORMONE: CPT

## 2023-01-16 PROCEDURE — 36415 COLL VENOUS BLD VENIPUNCTURE: CPT

## 2023-01-16 PROCEDURE — 71046 X-RAY EXAM CHEST 2 VIEWS: CPT | Mod: TC

## 2023-01-16 PROCEDURE — 82306 VITAMIN D 25 HYDROXY: CPT

## 2023-01-16 PROCEDURE — 85025 COMPLETE CBC W/AUTO DIFF WBC: CPT

## 2023-01-17 LAB
25(OH)D3 SERPL-MCNC: 22.7 NG/ML
TSH SERPL DL<=0.005 MIU/L-ACNC: 0.6 UIU/ML (ref 0.36–3.74)

## 2023-01-31 ENCOUNTER — OFFICE VISIT (OUTPATIENT)
Dept: FAMILY MEDICINE | Facility: CLINIC | Age: 38
End: 2023-01-31
Payer: COMMERCIAL

## 2023-01-31 VITALS — HEART RATE: 109 BPM | DIASTOLIC BLOOD PRESSURE: 80 MMHG | OXYGEN SATURATION: 99 % | SYSTOLIC BLOOD PRESSURE: 130 MMHG

## 2023-01-31 DIAGNOSIS — J40 BRONCHITIS: Primary | ICD-10-CM

## 2023-01-31 DIAGNOSIS — F90.9 ATTENTION DEFICIT HYPERACTIVITY DISORDER (ADHD), UNSPECIFIED ADHD TYPE: Chronic | ICD-10-CM

## 2023-01-31 DIAGNOSIS — F60.3 BORDERLINE PERSONALITY DISORDER: Chronic | ICD-10-CM

## 2023-01-31 DIAGNOSIS — D50.9 IRON DEFICIENCY ANEMIA, UNSPECIFIED IRON DEFICIENCY ANEMIA TYPE: Chronic | ICD-10-CM

## 2023-01-31 DIAGNOSIS — B33.8 RSV (RESPIRATORY SYNCYTIAL VIRUS INFECTION): ICD-10-CM

## 2023-01-31 DIAGNOSIS — F41.8 MIXED ANXIETY AND DEPRESSIVE DISORDER: Chronic | ICD-10-CM

## 2023-01-31 PROCEDURE — 3075F PR MOST RECENT SYSTOLIC BLOOD PRESS GE 130-139MM HG: ICD-10-PCS | Mod: ,,, | Performed by: FAMILY MEDICINE

## 2023-01-31 PROCEDURE — 1159F PR MEDICATION LIST DOCUMENTED IN MEDICAL RECORD: ICD-10-PCS | Mod: ,,, | Performed by: FAMILY MEDICINE

## 2023-01-31 PROCEDURE — 1159F MED LIST DOCD IN RCRD: CPT | Mod: ,,, | Performed by: FAMILY MEDICINE

## 2023-01-31 PROCEDURE — 1160F PR REVIEW ALL MEDS BY PRESCRIBER/CLIN PHARMACIST DOCUMENTED: ICD-10-PCS | Mod: ,,, | Performed by: FAMILY MEDICINE

## 2023-01-31 PROCEDURE — 1160F RVW MEDS BY RX/DR IN RCRD: CPT | Mod: ,,, | Performed by: FAMILY MEDICINE

## 2023-01-31 PROCEDURE — 99214 PR OFFICE/OUTPT VISIT, EST, LEVL IV, 30-39 MIN: ICD-10-PCS | Mod: ,,, | Performed by: FAMILY MEDICINE

## 2023-01-31 PROCEDURE — 3075F SYST BP GE 130 - 139MM HG: CPT | Mod: ,,, | Performed by: FAMILY MEDICINE

## 2023-01-31 PROCEDURE — 3079F DIAST BP 80-89 MM HG: CPT | Mod: ,,, | Performed by: FAMILY MEDICINE

## 2023-01-31 PROCEDURE — 3079F PR MOST RECENT DIASTOLIC BLOOD PRESSURE 80-89 MM HG: ICD-10-PCS | Mod: ,,, | Performed by: FAMILY MEDICINE

## 2023-01-31 PROCEDURE — 99214 OFFICE O/P EST MOD 30 MIN: CPT | Mod: ,,, | Performed by: FAMILY MEDICINE

## 2023-01-31 NOTE — PROGRESS NOTES
Bandar Godinez MD   [facility]  MEDICAL GROUP [facility] [address] [address]  [phone]      PATIENT NAME: Mercedes Jensen  : 1985  DATE: 23  MRN: [id number]      Billing Provider: Bandar Godinez MD  Level of Service:   Patient PCP Information       Provider PCP Type    Bandar Godinez MD General            Reason for Visit / Chief Complaint: No chief complaint on file.       Update PCP  Update Chief Complaint         History of Present Illness / Problem Focused Workflow     Mercedes Jensen presents to the clinic with No chief complaint on file.       Follow up s/p discharge from [facility] 23-23 due to SOB, bronchitis and RSV infection.  I have reviewed available hospital notes and lab results.  She had therapeutic bronchoscopy done and has follow up scheduled with Dr. Callejas on 23.  Was on IV steroids during course of stay.  She was anemic prior to admission and was on po iron supplement.  While in hospital, she was given IV iron infusion and had PO supplemental dose increased.  Was discharged on symbicort, steroid taper and duonebs.        Review of Systems     Review of Systems   Constitutional:  Positive for fatigue. Negative for activity change, chills and fever.   HENT:  Negative for sore throat.    Eyes:  Negative for pain.   Respiratory:  Positive for cough, shortness of breath and wheezing. Negative for chest tightness.    Cardiovascular:  Negative for chest pain and palpitations.   Gastrointestinal:  Negative for abdominal pain.   Neurological:  Negative for dizziness, syncope and weakness.   Psychiatric/Behavioral:  Negative for confusion.       Medical / Social / Family History     Past Medical History:   Diagnosis Date    ADHD     Anxiety     Edema, lower extremity     Pain in left leg     Venous insufficiency        Past Surgical History:   Procedure Laterality Date    ABDOMINAL SURGERY   11/18/2020    Gastric sleeve performed by Dr Villarreal in [place]    APPENDECTOMY      EPIDURAL STEROID INJECTION INTO LUMBAR SPINE  2012    L4-5, in [place].    TONSILLECTOMY      VENOUS ABLATION Left 03/30/2015    GSV Ablation performed by Dr. Steven Llamas.       Social History    reports that she has never smoked. She has never used smokeless tobacco. She reports current alcohol use. She reports that she does not use drugs.   Social History     Tobacco Use    Smoking status: Never    Smokeless tobacco: Never   Substance Use Topics    Alcohol use: Yes     Comment: last drink was this evening at supper.    Drug use: Never       Family History  Family History   Problem Relation Age of Onset    Hypertension Mother     Bipolar disorder Father     Hyperlipidemia Father     Hypertension Father     Scoliosis Brother        Medications and Allergies     Medications  No outpatient medications have been marked as taking for the 1/31/23 encounter (Office Visit) with Bandar Godinez MD.       Allergies  Review of patient's allergies indicates:   Allergen Reactions    Adhesive tape-silicones     Hydrocodone-acetaminophen Itching    Latex, natural rubber        Physical Examination     Vitals:    01/31/23 1641   BP: 130/80   Pulse: 109   O2 sat 99% on RA    Physical Exam  Vitals reviewed.   Constitutional:       General: She is not in acute distress.     Appearance: Normal appearance. She is obese. She is not ill-appearing or toxic-appearing.   HENT:      Head: Normocephalic and atraumatic.   Eyes:      Extraocular Movements: Extraocular movements intact.      Conjunctiva/sclera: Conjunctivae normal.      Pupils: Pupils are equal, round, and reactive to light.   Cardiovascular:      Rate and Rhythm: Normal rate and regular rhythm.      Heart sounds: Normal heart sounds.   Pulmonary:      Effort: Pulmonary effort is normal. No respiratory distress.      Breath sounds: Normal breath sounds. No stridor. No wheezing, rhonchi or rales.       Comments: Lungs are CTAB with good air movement  Musculoskeletal:         General: Normal range of motion.      Cervical back: Normal range of motion.   Skin:     General: Skin is warm and dry.   Neurological:      General: No focal deficit present.      Mental Status: She is alert and oriented to person, place, and time.   Psychiatric:         Mood and Affect: Mood normal.         Behavior: Behavior normal.        Assessment and Plan (including Health Maintenance)      Problem List  Smart Sets  Document Outside HM   :    Plan: continue current care and follow up with pulmonologist as scheduled.  Follow up with Sohail for treatment of MDD/ROSETTA and ADHD.  I have reviewed her  report.        Health Maintenance Due   Topic Date Due    Hepatitis C Screening  Never done    Cervical Cancer Screening  Never done    Lipid Panel  Never done    HIV Screening  Never done    TETANUS VACCINE  Never done    Hemoglobin A1c (Diabetic Prevention Screening)  Never done    COVID-19 Vaccine (3 - Booster for Moderna series) 05/07/2021    Influenza Vaccine (1) 09/01/2022       Problem List Items Addressed This Visit          Psychiatric    Attention deficit hyperactivity disorder    Borderline personality disorder    Mixed anxiety and depressive disorder       Oncology    Iron deficiency anemia (Chronic)     Other Visit Diagnoses       Bronchitis    -  Primary    RSV (respiratory syncytial virus infection)                The patient has no Health Maintenance topics of status Not Due    No future appointments.         Signature:  MD KAILEY Calvillo [facility]  MEDICAL GROUP [facility]  [address] [place] [address] [place] [address]  [phone]    Date of encounter: 1/31/23

## 2023-02-14 ENCOUNTER — OFFICE VISIT (OUTPATIENT)
Dept: FAMILY MEDICINE | Facility: CLINIC | Age: 38
End: 2023-02-14
Payer: COMMERCIAL

## 2023-02-14 VITALS
HEART RATE: 81 BPM | SYSTOLIC BLOOD PRESSURE: 124 MMHG | WEIGHT: 287 LBS | DIASTOLIC BLOOD PRESSURE: 77 MMHG | BODY MASS INDEX: 47.82 KG/M2 | HEIGHT: 65 IN

## 2023-02-14 DIAGNOSIS — J45.909 ASTHMA, UNSPECIFIED ASTHMA SEVERITY, UNSPECIFIED WHETHER COMPLICATED, UNSPECIFIED WHETHER PERSISTENT: Chronic | ICD-10-CM

## 2023-02-14 DIAGNOSIS — J02.9 SORE THROAT: Primary | ICD-10-CM

## 2023-02-14 DIAGNOSIS — D50.9 IRON DEFICIENCY ANEMIA, UNSPECIFIED IRON DEFICIENCY ANEMIA TYPE: Chronic | ICD-10-CM

## 2023-02-14 PROCEDURE — 1159F PR MEDICATION LIST DOCUMENTED IN MEDICAL RECORD: ICD-10-PCS | Mod: ,,, | Performed by: FAMILY MEDICINE

## 2023-02-14 PROCEDURE — 3078F PR MOST RECENT DIASTOLIC BLOOD PRESSURE < 80 MM HG: ICD-10-PCS | Mod: ,,, | Performed by: FAMILY MEDICINE

## 2023-02-14 PROCEDURE — 3008F BODY MASS INDEX DOCD: CPT | Mod: ,,, | Performed by: FAMILY MEDICINE

## 2023-02-14 PROCEDURE — 3074F SYST BP LT 130 MM HG: CPT | Mod: ,,, | Performed by: FAMILY MEDICINE

## 2023-02-14 PROCEDURE — 99214 PR OFFICE/OUTPT VISIT, EST, LEVL IV, 30-39 MIN: ICD-10-PCS | Mod: ,,, | Performed by: FAMILY MEDICINE

## 2023-02-14 PROCEDURE — 1160F PR REVIEW ALL MEDS BY PRESCRIBER/CLIN PHARMACIST DOCUMENTED: ICD-10-PCS | Mod: ,,, | Performed by: FAMILY MEDICINE

## 2023-02-14 PROCEDURE — 3074F PR MOST RECENT SYSTOLIC BLOOD PRESSURE < 130 MM HG: ICD-10-PCS | Mod: ,,, | Performed by: FAMILY MEDICINE

## 2023-02-14 PROCEDURE — 3078F DIAST BP <80 MM HG: CPT | Mod: ,,, | Performed by: FAMILY MEDICINE

## 2023-02-14 PROCEDURE — 1159F MED LIST DOCD IN RCRD: CPT | Mod: ,,, | Performed by: FAMILY MEDICINE

## 2023-02-14 PROCEDURE — 99214 OFFICE O/P EST MOD 30 MIN: CPT | Mod: ,,, | Performed by: FAMILY MEDICINE

## 2023-02-14 PROCEDURE — 3008F PR BODY MASS INDEX (BMI) DOCUMENTED: ICD-10-PCS | Mod: ,,, | Performed by: FAMILY MEDICINE

## 2023-02-14 PROCEDURE — 1160F RVW MEDS BY RX/DR IN RCRD: CPT | Mod: ,,, | Performed by: FAMILY MEDICINE

## 2023-02-14 RX ORDER — AMOXICILLIN 875 MG/1
875 TABLET, FILM COATED ORAL EVERY 12 HOURS
Qty: 14 TABLET | Refills: 0 | Status: SHIPPED | OUTPATIENT
Start: 2023-02-14 | End: 2023-03-28 | Stop reason: ALTCHOICE

## 2023-02-14 RX ORDER — BUPROPION HCL 300 MG
300 TABLET, EXTENDED RELEASE 24 HR ORAL
COMMUNITY
Start: 2022-11-30 | End: 2023-02-15

## 2023-02-14 RX ORDER — IPRATROPIUM BROMIDE AND ALBUTEROL SULFATE 2.5; .5 MG/3ML; MG/3ML
SOLUTION RESPIRATORY (INHALATION) EVERY 6 HOURS PRN
COMMUNITY
Start: 2023-01-24

## 2023-02-14 RX ORDER — DEXTROAMPHETAMINE SACCHARATE, AMPHETAMINE ASPARTATE, DEXTROAMPHETAMINE SULFATE AND AMPHETAMINE SULFATE 7.5; 7.5; 7.5; 7.5 MG/1; MG/1; MG/1; MG/1
1 TABLET ORAL 2 TIMES DAILY
COMMUNITY
Start: 2023-01-12

## 2023-02-14 RX ORDER — MIRTAZAPINE 30 MG/1
30 TABLET, FILM COATED ORAL NIGHTLY PRN
COMMUNITY
Start: 2022-11-30 | End: 2023-02-15

## 2023-02-14 RX ORDER — MEPOLIZUMAB 100 MG/ML
1 INJECTION, SOLUTION SUBCUTANEOUS
COMMUNITY
Start: 2023-02-08

## 2023-02-14 RX ORDER — NYSTATIN 100000 [USP'U]/ML
SUSPENSION ORAL
COMMUNITY
Start: 2023-01-24

## 2023-02-14 RX ORDER — BENZONATATE 100 MG/1
CAPSULE ORAL
COMMUNITY
Start: 2023-01-16 | End: 2023-02-14 | Stop reason: ALTCHOICE

## 2023-02-14 RX ORDER — DOCUSATE SODIUM 100 MG/1
100 CAPSULE, LIQUID FILLED ORAL 2 TIMES DAILY
COMMUNITY
Start: 2023-01-24

## 2023-02-14 RX ORDER — ESCITALOPRAM OXALATE 10 MG/1
10 TABLET ORAL
COMMUNITY
Start: 2023-01-29

## 2023-02-14 RX ORDER — FERROUS SULFATE TAB 325 MG (65 MG ELEMENTAL FE) 325 (65 FE) MG
TAB ORAL 2 TIMES DAILY
COMMUNITY
Start: 2023-01-24 | End: 2023-02-14 | Stop reason: CLARIF

## 2023-02-14 RX ORDER — BUSPIRONE HYDROCHLORIDE 10 MG/1
10 TABLET ORAL 2 TIMES DAILY
COMMUNITY
Start: 2022-11-30 | End: 2023-02-15

## 2023-02-14 RX ORDER — BUDESONIDE AND FORMOTEROL FUMARATE DIHYDRATE 160; 4.5 UG/1; UG/1
2 AEROSOL RESPIRATORY (INHALATION) 2 TIMES DAILY
COMMUNITY
Start: 2023-01-24

## 2023-02-14 NOTE — PROGRESS NOTES
MD KAILEY Calvillo [facility]  MEDICAL GROUP [facility] FAMILY [address] [address]  [phone]      PATIENT NAME: Mercedes Jensen  : 1985  DATE: 23  MRN: [id number]      Billing Provider: Bandar Godinez MD  Level of Service:   Patient PCP Information       Provider PCP Type    Bandar Godinez MD General            Reason for Visit / Chief Complaint: Sore Throat       Update PCP  Update Chief Complaint         History of Present Illness / Problem Focused Workflow     Mercedes Jensen presents to the clinic with Sore Throat       Sore throat x 2 days.  Denies any headache, N/V/D or earache.  Says that she has recently seen pulmonologist and was diagnosed with eosinophilic asthma.  Has iron deficiency anemia.  WBC normal.      Review of Systems     Review of Systems   Constitutional:  Negative for activity change, chills and fever.   HENT:  Positive for sore throat. Negative for ear pain.    Eyes:  Negative for pain.   Respiratory:  Positive for cough. Negative for chest tightness and shortness of breath.    Cardiovascular:  Negative for chest pain and palpitations.   Gastrointestinal:  Negative for abdominal pain.   Neurological:  Negative for dizziness, syncope and weakness.   Psychiatric/Behavioral:  Negative for confusion.       Medical / Social / Family History     Past Medical History:   Diagnosis Date    ADHD     Anxiety     Edema, lower extremity     Pain in left leg     Venous insufficiency        Past Surgical History:   Procedure Laterality Date    ABDOMINAL SURGERY  2020    Gastric sleeve performed by Dr Villarreal in [place]    APPENDECTOMY      EPIDURAL STEROID INJECTION INTO LUMBAR SPINE  2012    L4-5, in [place].    TONSILLECTOMY      VENOUS ABLATION Left 2015    GSV Ablation performed by Dr. Steven Llamas.       Social History    reports that she has never smoked. She has never been exposed to tobacco smoke. She  has never used smokeless tobacco. She reports current alcohol use. She reports that she does not use drugs.   Social History     Tobacco Use    Smoking status: Never     Passive exposure: Never    Smokeless tobacco: Never   Substance Use Topics    Alcohol use: Yes     Comment: last drink was this evening at supper.    Drug use: Never       Family History  Family History   Problem Relation Age of Onset    Hypertension Mother     Bipolar disorder Father     Hyperlipidemia Father     Hypertension Father     Scoliosis Brother        Medications and Allergies     Medications  Outpatient Medications Marked as Taking for the 23 encounter (Office Visit) with Bandar Godinez MD   Medication Sig Dispense Refill    albuterol (VENTOLIN HFA) 90 mcg/actuation inhaler Inhale 2 puffs into the lungs every 6 (six) hours as needed for Wheezing. Rescue 18 g 0    albuterol-ipratropium (DUO-NEB) 2.5 mg-0.5 mg/3 mL nebulizer solution Take by nebulization every 6 (six) hours as needed.      busPIRone (BUSPAR) 10 MG tablet Take 10 mg by mouth 2 (two) times daily.      CALCIUM CITRATE ORAL Take by mouth.      cyanocobalamin, vitamin B-12, (VITAMIN B-12 ORAL) Take by mouth.      dextroamphetamine-amphetamine 30 mg Tab Take 1 tablet by mouth 2 (two) times daily.      docusate sodium (COLACE) 100 MG capsule Take 100 mg by mouth 2 (two) times daily.      EScitalopram oxalate (LEXAPRO) 10 MG tablet Take 10 mg by mouth.      iron-vitamin C 100-250 mg, ICAR-C, (ICAR-C) 100-250 mg Tab Take 1 tablet by mouth Daily. take this medicine on an empty stomach, 1 hour before or 2 hours after a meal 90 tablet 1    mepolizumab 100 mg/mL AtIn 1 mL.      multivitamin capsule Take 1 capsule by mouth once daily.      nystatin (MYCOSTATIN) 100,000 unit/mL suspension SMARTSI Teaspoon By Mouth 4 Times Daily      REMERON 30 mg tablet Take 30 mg by mouth nightly as needed.      SYMBICORT 160-4.5 mcg/actuation HFAA 2 puffs 2 (two) times daily.      thiamine  HCl (VITAMIN B-1 ORAL) Take by mouth.      tiZANidine (ZANAFLEX) 4 MG tablet TAKE 1 OR 2 TABLETS BY MOUTH AT BEDTIME FOR MUSCLE SPASMS 60 tablet 2    WELLBUTRIN  mg 24 hr tablet Take 300 mg by mouth.      XANAX 0.5 mg tablet Take 0.5 mg by mouth 3 (three) times daily as needed.         Allergies  Review of patient's allergies indicates:   Allergen Reactions    Adhesive tape-silicones     Hydrocodone-acetaminophen Itching    Latex, natural rubber        Physical Examination     Vitals:    02/14/23 1024   BP: 124/77   Pulse: 81     Physical Exam  Vitals reviewed.   Constitutional:       General: She is not in acute distress.     Appearance: Normal appearance. She is not toxic-appearing.   HENT:      Head: Normocephalic and atraumatic.      Mouth/Throat:      Pharynx: Oropharyngeal exudate and posterior oropharyngeal erythema present.   Eyes:      Extraocular Movements: Extraocular movements intact.      Conjunctiva/sclera: Conjunctivae normal.      Pupils: Pupils are equal, round, and reactive to light.   Cardiovascular:      Rate and Rhythm: Normal rate.   Pulmonary:      Effort: Pulmonary effort is normal.   Musculoskeletal:         General: Normal range of motion.      Cervical back: Normal range of motion.   Skin:     General: Skin is warm and dry.   Neurological:      General: No focal deficit present.      Mental Status: She is alert and oriented to person, place, and time.   Psychiatric:         Mood and Affect: Mood normal.         Behavior: Behavior normal.        Assessment and Plan (including Health Maintenance)      Problem List  Smart Sets  Document Outside HM   :    Plan: recommend salt water gargles several x day.  Continue all other current care.        Health Maintenance Due   Topic Date Due    Hepatitis C Screening  Never done    Cervical Cancer Screening  Never done    Lipid Panel  Never done    HIV Screening  Never done    TETANUS VACCINE  Never done    Hemoglobin A1c (Diabetic Prevention  Screening)  Never done    COVID-19 Vaccine (3 - Booster for Moderna series) 05/07/2021    Influenza Vaccine (1) 09/01/2022       Problem List Items Addressed This Visit          Oncology    Iron deficiency anemia (Chronic)     Other Visit Diagnoses       Sore throat    -  Primary    Relevant Medications    amoxicillin (AMOXIL) 875 MG tablet    Asthma, unspecified asthma severity, unspecified whether complicated, unspecified whether persistent  (Chronic)               The patient has no Health Maintenance topics of status Not Due    No future appointments.         Signature:  MD KAILEY Calvillo [facility]  MEDICAL GROUP [facility]  [address] [place] [id number]  [phone]    Date of encounter: 2/14/23

## 2023-02-14 NOTE — PATIENT INSTRUCTIONS
Do salt water gargles several times per day (spit out do not swallow).    Rinse mouth with water after using inhaler.

## 2023-02-15 ENCOUNTER — HOSPITAL ENCOUNTER (EMERGENCY)
Facility: HOSPITAL | Age: 38
Discharge: HOME | End: 2023-02-16
Payer: COMMERCIAL

## 2023-02-15 VITALS
SYSTOLIC BLOOD PRESSURE: 135 MMHG | TEMPERATURE: 98 F | HEART RATE: 89 BPM | DIASTOLIC BLOOD PRESSURE: 73 MMHG | HEIGHT: 65 IN | BODY MASS INDEX: 48.59 KG/M2 | OXYGEN SATURATION: 98 % | WEIGHT: 291.63 LBS | RESPIRATION RATE: 22 BRPM

## 2023-02-15 DIAGNOSIS — T80.89XA: Primary | ICD-10-CM

## 2023-02-15 PROCEDURE — 99282 EMERGENCY DEPT VISIT SF MDM: CPT

## 2023-02-15 PROCEDURE — 99282 EMERGENCY DEPT VISIT SF MDM: CPT | Mod: ,,, | Performed by: NURSE PRACTITIONER

## 2023-02-15 PROCEDURE — 99282 PR EMERGENCY DEPT VISIT,LEVEL II: ICD-10-PCS | Mod: ,,, | Performed by: NURSE PRACTITIONER

## 2023-02-16 NOTE — DISCHARGE INSTRUCTIONS
Ice the affected area, as discussed.  Monitor for signs of infection or allergic reaction, which can include increased swelling, redness or rash.

## 2023-02-16 NOTE — ED TRIAGE NOTES
Pt reports pain, swelling and bruising to IM injection site. Pt self administered medication about 1 hour ago to her right thigh. Pt states this is the first time she administered medication.

## 2023-02-16 NOTE — ED PROVIDER NOTES
Encounter Date: 2/15/2023       History     Chief Complaint   Patient presents with    Allergic Reaction     37 year old obese  female presents to the ER tonight for concerns for possible localized reaction to new medication.  Pt reports she was recently diagnosed with eosinophilic asthma, and she was prescribed Nucala 100mg/1ml to inject subcutaneously at home.  Pt reports she injected herself tonight to right thigh.  She reports mild bruising and swelling. Denies itching, redness, SOB, wheezing, chest pain.     The history is provided by the patient.   Review of patient's allergies indicates:   Allergen Reactions    Adhesive tape-silicones     Hydrocodone-acetaminophen Itching    Latex, natural rubber      Past Medical History:   Diagnosis Date    ADHD     Anxiety     Edema, lower extremity     Pain in left leg     Venous insufficiency      Past Surgical History:   Procedure Laterality Date    ABDOMINAL SURGERY  11/18/2020    Gastric sleeve performed by Dr Villarreal in [place]    APPENDECTOMY      EPIDURAL STEROID INJECTION INTO LUMBAR SPINE  2012    L4-5, in [place].    TONSILLECTOMY      VENOUS ABLATION Left 03/30/2015    GSV Ablation performed by Dr. Steven Llamas.     Family History   Problem Relation Age of Onset    Hypertension Mother     Bipolar disorder Father     Hyperlipidemia Father     Hypertension Father     Scoliosis Brother      Social History     Tobacco Use    Smoking status: Never     Passive exposure: Never    Smokeless tobacco: Never   Substance Use Topics    Alcohol use: Yes     Comment: last drink was this evening at supper.    Drug use: Never     Review of Systems   Constitutional:  Negative for fever.   HENT:  Negative for sore throat.    Respiratory:  Negative for shortness of breath.    Cardiovascular:  Negative for chest pain.   Gastrointestinal:  Negative for nausea.   Genitourinary:  Negative for dysuria.   Musculoskeletal:  Negative for back pain.   Skin:  Positive for wound. Negative  for rash.   Neurological:  Negative for weakness.   Hematological:  Does not bruise/bleed easily.     Physical Exam     Initial Vitals [02/15/23 2344]   BP Pulse Resp Temp SpO2   135/73 89 (!) 22 98.1 °F (36.7 °C) 98 %      MAP       --         Physical Exam    Nursing note and vitals reviewed.  Constitutional: She appears well-developed and well-nourished. She is not diaphoretic. She is Obese . She is cooperative.  Non-toxic appearance. She does not have a sickly appearance. She does not appear ill. No distress.   HENT:   Head: Normocephalic and atraumatic.   Eyes: Pupils are equal, round, and reactive to light.   Neck: Neck supple.   Cardiovascular:  Normal rate, regular rhythm, normal heart sounds and intact distal pulses.     Exam reveals no gallop and no friction rub.       No murmur heard.  Pulmonary/Chest: Breath sounds normal. No respiratory distress. She has no wheezes. She has no rhonchi. She has no rales. She exhibits no tenderness.   Musculoskeletal:      Cervical back: Neck supple.        Legs:      Neurological: She is alert and oriented to person, place, and time.   Skin: Skin is warm and dry. Capillary refill takes less than 2 seconds.   Psychiatric: Her mood appears anxious.       Medical Screening Exam   See Full Note    ED Course   Procedures  Labs Reviewed - No data to display       Imaging Results    None          Medications - No data to display  Medical Decision Making:   Differential Diagnosis:   Allergic reaction, localized reaction, hematoma  ED Management:  Area does not appear infected or like a localized reaction.  Area appears like a typical subcutaneous injection site.  Will have patient ice the area and monitor for erythema, increased swelling or rash.                  Clinical Impression:   Final diagnoses:  [T80.89XA] Hematoma of injection site, initial encounter (Primary)        ED Disposition Condition    Discharge Stable          ED Prescriptions    None       Follow-up  Information       Follow up With Specialties Details Why Contact Info    Bandar Godinez MD Family Medicine Schedule an appointment as soon as possible for a visit in 3 days As needed, If symptoms worsen, For wound re-check [address] [facility]  [facility] [place] [address]  [phone]               MELIZA Santos  02/16/23 0013

## 2023-04-01 ENCOUNTER — HOSPITAL ENCOUNTER (EMERGENCY)
Facility: HOSPITAL | Age: 38
Discharge: HOME | End: 2023-04-01
Payer: COMMERCIAL

## 2023-04-01 VITALS
SYSTOLIC BLOOD PRESSURE: 145 MMHG | OXYGEN SATURATION: 99 % | RESPIRATION RATE: 18 BRPM | HEIGHT: 65 IN | WEIGHT: 293 LBS | TEMPERATURE: 98 F | HEART RATE: 83 BPM | BODY MASS INDEX: 48.82 KG/M2 | DIASTOLIC BLOOD PRESSURE: 82 MMHG

## 2023-04-01 DIAGNOSIS — Z00.00 ADULT GENERAL MEDICAL EXAM: Primary | ICD-10-CM

## 2023-04-01 PROCEDURE — 99282 EMERGENCY DEPT VISIT SF MDM: CPT

## 2023-04-01 PROCEDURE — 99283 PR EMERGENCY DEPT VISIT,LEVEL III: ICD-10-PCS | Mod: ,,, | Performed by: NURSE PRACTITIONER

## 2023-04-01 PROCEDURE — 99283 EMERGENCY DEPT VISIT LOW MDM: CPT | Mod: ,,, | Performed by: NURSE PRACTITIONER

## 2023-04-01 NOTE — ED PROVIDER NOTES
Encounter Date: 4/1/2023       History     Chief Complaint   Patient presents with    altered temp in legs and feet     Presented with  c/o altered appearance and temperature of both feet and legs. Reports that she has an iliac vein stent with PVD of left leg. She says that just PTA she noticed her left leg to be hot and red and right leg to be cold and purple. Denies similar event. States has chronic pain and swelling in left leg due to PVD. Also reports under the care of Dr. Callejas, Pulmonologist and was diagnosed with eosinophilic asthma in January. She says that she felt SOB earlier and had to use inhaler. She reports that she is feeling better but remains very anxious. States that she does not know what is anxiety and asthma. She says that she takes Xanax prn and usually requires twice a day. Did not take one tonight.    Review of patient's allergies indicates:   Allergen Reactions    Adhesive tape-silicones     Hydrocodone-acetaminophen Itching    Latex, natural rubber      Past Medical History:   Diagnosis Date    ADHD     Anxiety     Edema, lower extremity     Pain in left leg     Venous insufficiency      Past Surgical History:   Procedure Laterality Date    ABDOMINAL SURGERY  11/18/2020    Gastric sleeve performed by Dr Villarreal in [place]    APPENDECTOMY      EPIDURAL STEROID INJECTION INTO LUMBAR SPINE  2012    L4-5, in [place].    TONSILLECTOMY      VENOUS ABLATION Left 03/30/2015    GSV Ablation performed by Dr. Steven Llamas.     Family History   Problem Relation Age of Onset    Hypertension Mother     Bipolar disorder Father     Hyperlipidemia Father     Hypertension Father     Scoliosis Brother      Social History     Tobacco Use    Smoking status: Never     Passive exposure: Never    Smokeless tobacco: Never   Substance Use Topics    Alcohol use: Yes     Comment: last drink was this evening at supper.    Drug use: Never     Review of Systems   Constitutional:  Negative for activity change,  appetite change and fever.   HENT: Negative.     Respiratory:  Negative for cough, shortness of breath and wheezing.    Cardiovascular:  Positive for leg swelling (left that is chronic). Negative for chest pain.   Gastrointestinal:  Negative for abdominal pain, diarrhea, nausea and vomiting.   Genitourinary:  Negative for difficulty urinating.   Musculoskeletal:  Positive for myalgias (chronic to left leg).   Skin:  Positive for color change (to lower ext bilat).   Neurological:  Negative for dizziness and headaches.   Psychiatric/Behavioral:  The patient is nervous/anxious.      Physical Exam     Initial Vitals [04/01/23 0107]   BP Pulse Resp Temp SpO2   (!) 165/100 92 20 98.2 °F (36.8 °C) 97 %      MAP       --         Physical Exam    Nursing note and vitals reviewed.  Constitutional: She appears well-developed and well-nourished. No distress.   HENT:   Head: Normocephalic.   Right Ear: External ear normal.   Left Ear: External ear normal.   Nose: Nose normal.   Mouth/Throat: Oropharynx is clear and moist.   Eyes: Conjunctivae and EOM are normal.   Neck: Neck supple.   Normal range of motion.  Cardiovascular:  Normal rate, regular rhythm, normal heart sounds and intact distal pulses.           No murmur heard.  Pulmonary/Chest: Breath sounds normal. No respiratory distress. She has no wheezes. She has no rhonchi. She has no rales. She exhibits no tenderness.   O2 sat 99%   Abdominal: Abdomen is soft. Bowel sounds are normal. There is no abdominal tenderness.   Musculoskeletal:         General: Edema (1+ left pedal) present. No tenderness. Normal range of motion.      Cervical back: Normal range of motion and neck supple.      Comments: Both legs warm to touch with good bounding pedal and posterior tibial pulses bilat.     Neurological: She is alert and oriented to person, place, and time. She has normal strength.   Skin: Skin is warm and dry. Capillary refill takes less than 2 seconds.   Psychiatric:   Anxious.  Very talkative regarding health and personal issues that she has been dealing with.       Medical Screening Exam   See Full Note    ED Course   Procedures  Labs Reviewed - No data to display       Imaging Results    None          Medications - No data to display  Medical Decision Making:   ED Management:  Presented with  c/o altered appearance and temperature of both feet and legs. Reports that she has an iliac vein stent with PVD of left leg. She says that just PTA she noticed her left leg to be hot and red and right leg to be cold and purple. No deficits with lower ext noted upon assessment.    Had long discussion with pt and reassured pt that she had bounding pulses with both leg warms to touch. Lungs clear bilat. O2 sats 99% on RA. Reassured pt. Instructed to return to the ED for any concerns and follow-up with her PCP and Dr. Callejas as scheduled.                 Clinical Impression:   Final diagnoses:  [Z00.00] Adult general medical exam (Primary)        ED Disposition Condition    Discharge           ED Prescriptions    None       Follow-up Information       Follow up With Specialties Details Why Contact Info    Farooq Casey MD Family Medicine   [address] [address] [id number] [phone]               Diana Bansal NP  04/01/23 0652

## 2023-04-01 NOTE — ED TRIAGE NOTES
Presents to ER with c/o altered temps to lower legs and feet. States left leg is hot and red and the right leg is cold and purple. This started about 1 hour ago when she woke up to go to the restroom. Usually has pain to left thigh. Also thinks is having sciatic pain to right side. Thinks she has had some palpitations today. Good pulses to both feet. Does have problems with anxiety, but feels this is not anxiety related. Feet feel the same in temp.

## 2023-04-06 ENCOUNTER — OFFICE VISIT (OUTPATIENT)
Dept: FAMILY MEDICINE | Facility: CLINIC | Age: 38
End: 2023-04-06
Payer: COMMERCIAL

## 2023-04-06 VITALS
HEIGHT: 65 IN | HEART RATE: 84 BPM | BODY MASS INDEX: 48.82 KG/M2 | SYSTOLIC BLOOD PRESSURE: 138 MMHG | WEIGHT: 293 LBS | DIASTOLIC BLOOD PRESSURE: 73 MMHG

## 2023-04-06 DIAGNOSIS — Z13.1 SCREENING FOR DIABETES MELLITUS: ICD-10-CM

## 2023-04-06 DIAGNOSIS — E66.01 MORBID OBESITY WITH BODY MASS INDEX (BMI) OF 45.0 TO 49.9 IN ADULT: Chronic | ICD-10-CM

## 2023-04-06 DIAGNOSIS — Z13.220 SCREENING, LIPID: ICD-10-CM

## 2023-04-06 DIAGNOSIS — D50.9 IRON DEFICIENCY ANEMIA, UNSPECIFIED IRON DEFICIENCY ANEMIA TYPE: Primary | ICD-10-CM

## 2023-04-06 DIAGNOSIS — I87.2 LYMPHEDEMA DUE TO VENOUS INSUFFICIENCY: ICD-10-CM

## 2023-04-06 DIAGNOSIS — F51.01 PRIMARY INSOMNIA: ICD-10-CM

## 2023-04-06 DIAGNOSIS — I89.0 LYMPHEDEMA DUE TO VENOUS INSUFFICIENCY: ICD-10-CM

## 2023-04-06 PROCEDURE — 82728 ASSAY OF FERRITIN: CPT | Mod: ,,, | Performed by: CLINICAL MEDICAL LABORATORY

## 2023-04-06 PROCEDURE — 3078F PR MOST RECENT DIASTOLIC BLOOD PRESSURE < 80 MM HG: ICD-10-PCS | Mod: ,,, | Performed by: FAMILY MEDICINE

## 2023-04-06 PROCEDURE — 80061 LIPID PANEL: CPT | Mod: ,,, | Performed by: CLINICAL MEDICAL LABORATORY

## 2023-04-06 PROCEDURE — 83550 IRON BINDING TEST: CPT | Mod: ,,, | Performed by: CLINICAL MEDICAL LABORATORY

## 2023-04-06 PROCEDURE — 3008F PR BODY MASS INDEX (BMI) DOCUMENTED: ICD-10-PCS | Mod: ,,, | Performed by: FAMILY MEDICINE

## 2023-04-06 PROCEDURE — 80061 LIPID PANEL: ICD-10-PCS | Mod: ,,, | Performed by: CLINICAL MEDICAL LABORATORY

## 2023-04-06 PROCEDURE — 99214 PR OFFICE/OUTPT VISIT, EST, LEVL IV, 30-39 MIN: ICD-10-PCS | Mod: ,,, | Performed by: FAMILY MEDICINE

## 2023-04-06 PROCEDURE — 83036 HEMOGLOBIN A1C: ICD-10-PCS | Mod: ,,, | Performed by: CLINICAL MEDICAL LABORATORY

## 2023-04-06 PROCEDURE — 3008F BODY MASS INDEX DOCD: CPT | Mod: ,,, | Performed by: FAMILY MEDICINE

## 2023-04-06 PROCEDURE — 83540 IRON AND TIBC: ICD-10-PCS | Mod: ,,, | Performed by: CLINICAL MEDICAL LABORATORY

## 2023-04-06 PROCEDURE — 82728 FERRITIN: ICD-10-PCS | Mod: ,,, | Performed by: CLINICAL MEDICAL LABORATORY

## 2023-04-06 PROCEDURE — 85025 COMPLETE CBC W/AUTO DIFF WBC: CPT | Mod: ,,, | Performed by: CLINICAL MEDICAL LABORATORY

## 2023-04-06 PROCEDURE — 99214 OFFICE O/P EST MOD 30 MIN: CPT | Mod: ,,, | Performed by: FAMILY MEDICINE

## 2023-04-06 PROCEDURE — 3075F SYST BP GE 130 - 139MM HG: CPT | Mod: ,,, | Performed by: FAMILY MEDICINE

## 2023-04-06 PROCEDURE — 85025 CBC WITH DIFFERENTIAL: ICD-10-PCS | Mod: ,,, | Performed by: CLINICAL MEDICAL LABORATORY

## 2023-04-06 PROCEDURE — 83036 HEMOGLOBIN GLYCOSYLATED A1C: CPT | Mod: ,,, | Performed by: CLINICAL MEDICAL LABORATORY

## 2023-04-06 PROCEDURE — 1160F RVW MEDS BY RX/DR IN RCRD: CPT | Mod: ,,, | Performed by: FAMILY MEDICINE

## 2023-04-06 PROCEDURE — 1159F MED LIST DOCD IN RCRD: CPT | Mod: ,,, | Performed by: FAMILY MEDICINE

## 2023-04-06 PROCEDURE — 3075F PR MOST RECENT SYSTOLIC BLOOD PRESS GE 130-139MM HG: ICD-10-PCS | Mod: ,,, | Performed by: FAMILY MEDICINE

## 2023-04-06 PROCEDURE — 83540 ASSAY OF IRON: CPT | Mod: ,,, | Performed by: CLINICAL MEDICAL LABORATORY

## 2023-04-06 PROCEDURE — 1160F PR REVIEW ALL MEDS BY PRESCRIBER/CLIN PHARMACIST DOCUMENTED: ICD-10-PCS | Mod: ,,, | Performed by: FAMILY MEDICINE

## 2023-04-06 PROCEDURE — 83550 IRON AND TIBC: ICD-10-PCS | Mod: ,,, | Performed by: CLINICAL MEDICAL LABORATORY

## 2023-04-06 PROCEDURE — 1159F PR MEDICATION LIST DOCUMENTED IN MEDICAL RECORD: ICD-10-PCS | Mod: ,,, | Performed by: FAMILY MEDICINE

## 2023-04-06 PROCEDURE — 3078F DIAST BP <80 MM HG: CPT | Mod: ,,, | Performed by: FAMILY MEDICINE

## 2023-04-06 RX ORDER — IRON,CARBONYL/ASCORBIC ACID 100-250 MG
1 TABLET ORAL DAILY
Qty: 90 TABLET | Refills: 1 | Status: SHIPPED | OUTPATIENT
Start: 2023-04-06

## 2023-04-06 RX ORDER — RAMELTEON 8 MG/1
8 TABLET ORAL NIGHTLY
Qty: 30 TABLET | Refills: 2 | Status: SHIPPED | OUTPATIENT
Start: 2023-04-06

## 2023-04-06 RX ORDER — SEMAGLUTIDE 0.25 MG/.5ML
0.25 INJECTION, SOLUTION SUBCUTANEOUS
Qty: 2 ML | Refills: 0 | Status: SHIPPED | OUTPATIENT
Start: 2023-04-06 | End: 2023-04-21

## 2023-04-06 NOTE — PROGRESS NOTES
Bandar Godinez MD   [facility]  MEDICAL GROUP [facility] FAMILY [address] [place] [address]  [phone]      PATIENT NAME: Mercedes Grant  : 1985  DATE: 23  MRN: [id number]      Billing Provider: Bandar Godinez MD  Level of Service:   Patient PCP Information       Provider PCP Type    Bandar Godinez MD General            Reason for Visit / Chief Complaint: Follow-up (Follow-up. Medication refills. )       Update PCP  Update Chief Complaint         History of Present Illness / Problem Focused Workflow     Mercedes Grant presents to the clinic with Follow-up (Follow-up. Medication refills. )       39 yo WF here for follow up iron deficiency anemia.  Needs lab work done.  Has been diagnosed with eosinophilic asthma. Seeing pulmonologist, Dr. Callejas, and has been started on Nucala injections.  Has follow up with him on 23.  Is having difficulty sleeping and reports is only getting a few hours of sleep per night.  Not taking daytime naps.  Previously was on restoril but says that it was not helping.  Is still trying to lose weight.  Is unable to exercise much due to her severe asthma.  Also has back and leg pain and lymphedema.      Follow-up  Associated symptoms include arthralgias and coughing. Pertinent negatives include no abdominal pain, chest pain, chills, fever, sore throat or weakness.     Review of Systems     Review of Systems   Constitutional:  Negative for activity change, chills and fever.   HENT:  Negative for sore throat.    Eyes:  Negative for pain.   Respiratory:  Positive for cough, shortness of breath and wheezing. Negative for chest tightness.    Cardiovascular:  Negative for chest pain and palpitations.   Gastrointestinal:  Negative for abdominal pain.   Musculoskeletal:  Positive for arthralgias, back pain, gait problem and leg pain.   Neurological:  Negative for dizziness, syncope and weakness.    Psychiatric/Behavioral:  Positive for sleep disturbance. Negative for confusion.       Medical / Social / Family History     Past Medical History:   Diagnosis Date    ADHD     Anxiety     Edema, lower extremity     Pain in left leg     Venous insufficiency        Past Surgical History:   Procedure Laterality Date    ABDOMINAL SURGERY  11/18/2020    Gastric sleeve performed by Dr Villarreal in [place]    APPENDECTOMY      EPIDURAL STEROID INJECTION INTO LUMBAR SPINE  2012    L4-5, in [place].    TONSILLECTOMY      VENOUS ABLATION Left 03/30/2015    GSV Ablation performed by Dr. Steven Llamas.       Social History    reports that she has never smoked. She has never been exposed to tobacco smoke. She has never used smokeless tobacco. She reports current alcohol use. She reports that she does not use drugs.   Social History     Tobacco Use    Smoking status: Never     Passive exposure: Never    Smokeless tobacco: Never   Substance Use Topics    Alcohol use: Yes     Comment: last drink was this evening at supper.    Drug use: Never       Family History  Family History   Problem Relation Age of Onset    Hypertension Mother     Bipolar disorder Father     Hyperlipidemia Father     Hypertension Father     Scoliosis Brother        Medications and Allergies     Medications  No outpatient medications have been marked as taking for the 4/6/23 encounter (Office Visit) with Bandar Godinez MD.       Allergies  Review of patient's allergies indicates:   Allergen Reactions    Adhesive tape-silicones     Hydrocodone-acetaminophen Itching    Latex, natural rubber        Physical Examination     Vitals:    04/06/23 1628   BP: 138/73   Pulse: 84     Physical Exam  Vitals reviewed.   Constitutional:       General: She is not in acute distress.     Appearance: Normal appearance. She is obese.   HENT:      Head: Normocephalic and atraumatic.   Eyes:      Extraocular Movements: Extraocular movements intact.      Conjunctiva/sclera: Conjunctivae  normal.      Pupils: Pupils are equal, round, and reactive to light.   Cardiovascular:      Rate and Rhythm: Normal rate and regular rhythm.      Heart sounds: Normal heart sounds.   Pulmonary:      Effort: Pulmonary effort is normal. No respiratory distress.      Breath sounds: Normal breath sounds. No wheezing, rhonchi or rales.   Musculoskeletal:      Cervical back: Normal range of motion.   Skin:     General: Skin is warm and dry.   Neurological:      General: No focal deficit present.      Mental Status: She is alert and oriented to person, place, and time.      Gait: Gait abnormal.   Psychiatric:         Mood and Affect: Mood normal.         Behavior: Behavior normal.        Assessment and Plan (including Health Maintenance)      Problem List  Smart Sets  Document Outside HM   :    Plan: Patient has had extensive effort to lose weight.  She has multiple comorbidities (severe asthma, lymphedema, back and joint pain) that would likely improve if she was able to lose weight.  I am going to prescribe her wegovy to help in the weight loss process.          Health Maintenance Due   Topic Date Due    Hepatitis C Screening  Never done    Cervical Cancer Screening  Never done    Lipid Panel  Never done    HIV Screening  Never done    TETANUS VACCINE  Never done    Hemoglobin A1c (Diabetic Prevention Screening)  Never done    COVID-19 Vaccine (3 - Booster for Moderna series) 05/07/2021    Influenza Vaccine (1) 09/01/2022       Problem List Items Addressed This Visit          Oncology    Iron deficiency anemia - Primary (Chronic)    Relevant Medications    iron-vitamin C 100-250 mg, ICAR-C, (ICAR-C) 100-250 mg Tab    Other Relevant Orders    Iron and TIBC    Ferritin    CBC Auto Differential       Other    Insomnia    Relevant Medications    ramelteon (ROZEREM) 8 mg tablet    Lymphedema due to venous insufficiency    Relevant Medications    semaglutide, weight loss, (WEGOVY) 0.25 mg/0.5 mL PnIj     Other Visit Diagnoses        Screening, lipid        Relevant Orders    Lipid Panel    Screening for diabetes mellitus        Relevant Orders    Hemoglobin A1C    Morbid obesity with body mass index (BMI) of 45.0 to 49.9 in adult  (Chronic)       Relevant Medications    semaglutide, weight loss, (WEGOVY) 0.25 mg/0.5 mL PnIj            The patient has no Health Maintenance topics of status Not Due    Future Appointments   Date Time Provider Department Center   4/19/2023  4:00 PM Bandar Godinez MD [facility]            Signature:  MD KAILEY Calvillo [facility]  MED[facility] GROUP [facility]  [address] [place] [address] [place] [address]  [phone]    Date of encounter: 4/6/23

## 2023-04-07 LAB
BASOPHILS # BLD AUTO: 0.02 K/UL (ref 0–0.2)
BASOPHILS NFR BLD AUTO: 0.3 % (ref 0–1)
CHOLEST SERPL-MCNC: 175 MG/DL (ref 0–200)
CHOLEST/HDLC SERPL: 1.9 {RATIO}
DIFFERENTIAL METHOD BLD: ABNORMAL
EOSINOPHIL # BLD AUTO: 0.03 K/UL (ref 0–0.5)
EOSINOPHIL NFR BLD AUTO: 0.4 % (ref 1–4)
ERYTHROCYTE [DISTWIDTH] IN BLOOD BY AUTOMATED COUNT: 15.4 % (ref 11.5–14.5)
EST. AVERAGE GLUCOSE BLD GHB EST-MCNC: 64 MG/DL
FERRITIN SERPL-MCNC: 12 NG/ML (ref 8–252)
HBA1C MFR BLD HPLC: 4.5 % (ref 4.5–6.6)
HCT VFR BLD AUTO: 42.9 % (ref 38–47)
HDLC SERPL-MCNC: 91 MG/DL (ref 40–60)
HGB BLD-MCNC: 14.1 G/DL (ref 12–16)
IMM GRANULOCYTES # BLD AUTO: 0.01 K/UL (ref 0–0.04)
IMM GRANULOCYTES NFR BLD: 0.1 % (ref 0–0.4)
IRON SATN MFR SERPL: 20 % (ref 14–50)
IRON SERPL-MCNC: 88 ΜG/DL (ref 50–170)
LDLC SERPL CALC-MCNC: 72 MG/DL
LYMPHOCYTES # BLD AUTO: 1.75 K/UL (ref 1–4.8)
LYMPHOCYTES NFR BLD AUTO: 25.8 % (ref 27–41)
MCH RBC QN AUTO: 31.2 PG (ref 27–31)
MCHC RBC AUTO-ENTMCNC: 32.9 G/DL (ref 32–36)
MCV RBC AUTO: 94.9 FL (ref 80–96)
MONOCYTES # BLD AUTO: 0.49 K/UL (ref 0–0.8)
MONOCYTES NFR BLD AUTO: 7.2 % (ref 2–6)
MPC BLD CALC-MCNC: 9.8 FL (ref 9.4–12.4)
NEUTROPHILS # BLD AUTO: 4.49 K/UL (ref 1.8–7.7)
NEUTROPHILS NFR BLD AUTO: 66.2 % (ref 53–65)
NONHDLC SERPL-MCNC: 84 MG/DL
NRBC # BLD AUTO: 0 X10E3/UL
NRBC, AUTO (.00): 0 %
PLATELET # BLD AUTO: 349 K/UL (ref 150–400)
RBC # BLD AUTO: 4.52 M/UL (ref 4.2–5.4)
TIBC SERPL-MCNC: 450 ΜG/DL (ref 250–450)
TRIGL SERPL-MCNC: 62 MG/DL (ref 35–150)
VLDLC SERPL-MCNC: 12 MG/DL
WBC # BLD AUTO: 6.79 K/UL (ref 4.5–11)

## 2023-04-21 DIAGNOSIS — E66.01 MORBID OBESITY WITH BODY MASS INDEX (BMI) OF 45.0 TO 49.9 IN ADULT: Primary | ICD-10-CM

## 2023-04-21 RX ORDER — LIRAGLUTIDE 6 MG/ML
INJECTION SUBCUTANEOUS
Qty: 9 ML | Refills: 3 | Status: SHIPPED | OUTPATIENT
Start: 2023-04-21

## 2023-05-08 ENCOUNTER — HOSPITAL ENCOUNTER (OUTPATIENT)
Dept: RADIOLOGY | Facility: HOSPITAL | Age: 38
Discharge: HOME | End: 2023-05-08
Attending: NURSE PRACTITIONER
Payer: COMMERCIAL

## 2023-05-08 DIAGNOSIS — R06.2 WHEEZING: ICD-10-CM

## 2023-05-08 PROCEDURE — 71046 X-RAY EXAM CHEST 2 VIEWS: CPT | Mod: TC

## 2023-05-18 ENCOUNTER — HOSPITAL ENCOUNTER (OUTPATIENT)
Dept: RADIOLOGY | Facility: HOSPITAL | Age: 38
Discharge: HOME | End: 2023-05-18
Attending: NURSE PRACTITIONER
Payer: COMMERCIAL

## 2023-05-18 DIAGNOSIS — R06.2 WHEEZING: Primary | ICD-10-CM

## 2023-05-18 DIAGNOSIS — R06.2 WHEEZING: ICD-10-CM

## 2023-05-18 PROCEDURE — 71046 X-RAY EXAM CHEST 2 VIEWS: CPT | Mod: TC

## 2023-06-22 ENCOUNTER — LAB VISIT (OUTPATIENT)
Dept: LAB | Facility: HOSPITAL | Age: 38
End: 2023-06-22
Attending: NURSE PRACTITIONER
Payer: COMMERCIAL

## 2023-06-22 DIAGNOSIS — D50.9 IRON DEFICIENCY ANEMIA, UNSPECIFIED IRON DEFICIENCY ANEMIA TYPE: ICD-10-CM

## 2023-06-22 DIAGNOSIS — J82.83 EOSINOPHILIC ASTHMA: ICD-10-CM

## 2023-06-22 DIAGNOSIS — Z13.220 SCREENING FOR LIPOID DISORDERS: ICD-10-CM

## 2023-06-22 DIAGNOSIS — R23.3 SPONTANEOUS ECCHYMOSES: Primary | ICD-10-CM

## 2023-06-22 LAB
ALBUMIN SERPL BCP-MCNC: 3.5 G/DL (ref 3.5–5)
ALBUMIN/GLOB SERPL: 0.9 {RATIO}
ALP SERPL-CCNC: 102 U/L (ref 37–98)
ALT SERPL W P-5'-P-CCNC: 20 U/L (ref 13–56)
ANION GAP SERPL CALCULATED.3IONS-SCNC: 12 MMOL/L (ref 7–16)
AST SERPL W P-5'-P-CCNC: 21 U/L (ref 15–37)
BASOPHILS # BLD AUTO: 0.01 K/UL (ref 0–0.2)
BASOPHILS NFR BLD AUTO: 0.1 % (ref 0–1)
BILIRUB SERPL-MCNC: 0.4 MG/DL (ref ?–1.2)
BUN SERPL-MCNC: 12 MG/DL (ref 7–18)
BUN/CREAT SERPL: 15 (ref 6–20)
CALCIUM SERPL-MCNC: 9.2 MG/DL (ref 8.5–10.1)
CHLORIDE SERPL-SCNC: 105 MMOL/L (ref 98–107)
CO2 SERPL-SCNC: 29 MMOL/L (ref 21–32)
CREAT SERPL-MCNC: 0.82 MG/DL (ref 0.55–1.02)
DIFFERENTIAL METHOD BLD: ABNORMAL
EGFR (NO RACE VARIABLE) (RUSH/TITUS): 94 ML/MIN/1.73M2
EOSINOPHIL # BLD AUTO: 0.01 K/UL (ref 0–0.5)
EOSINOPHIL NFR BLD AUTO: 0.1 % (ref 1–4)
ERYTHROCYTE [DISTWIDTH] IN BLOOD BY AUTOMATED COUNT: 15.1 % (ref 11.5–14.5)
GLOBULIN SER-MCNC: 3.7 G/DL (ref 2–4)
GLUCOSE SERPL-MCNC: 93 MG/DL (ref 74–106)
HCT VFR BLD AUTO: 43.6 % (ref 38–47)
HGB BLD-MCNC: 13.8 G/DL (ref 12–16)
LYMPHOCYTES # BLD AUTO: 2.3 K/UL (ref 1–4.8)
LYMPHOCYTES NFR BLD AUTO: 29.3 % (ref 27–41)
MCH RBC QN AUTO: 30.6 PG (ref 27–31)
MCHC RBC AUTO-ENTMCNC: 31.7 G/DL (ref 32–36)
MCV RBC AUTO: 96.7 FL (ref 80–96)
MONOCYTES # BLD AUTO: 0.78 K/UL (ref 0–0.8)
MONOCYTES NFR BLD AUTO: 9.9 % (ref 2–6)
MPC BLD CALC-MCNC: 9.8 FL (ref 9.4–12.4)
NEUTROPHILS # BLD AUTO: 4.76 K/UL (ref 1.8–7.7)
NEUTROPHILS NFR BLD AUTO: 60.6 % (ref 53–65)
PLATELET # BLD AUTO: 401 K/UL (ref 150–400)
POTASSIUM SERPL-SCNC: 3.9 MMOL/L (ref 3.5–5.1)
PROT SERPL-MCNC: 7.2 G/DL (ref 6.4–8.2)
RBC # BLD AUTO: 4.51 M/UL (ref 4.2–5.4)
SODIUM SERPL-SCNC: 142 MMOL/L (ref 136–145)
WBC # BLD AUTO: 7.86 K/UL (ref 4.5–11)

## 2023-06-22 PROCEDURE — 80061 LIPID PANEL: CPT

## 2023-06-22 PROCEDURE — 83550 IRON BINDING TEST: CPT

## 2023-06-22 PROCEDURE — 36415 COLL VENOUS BLD VENIPUNCTURE: CPT

## 2023-06-22 PROCEDURE — 83540 ASSAY OF IRON: CPT

## 2023-06-22 PROCEDURE — 80053 COMPREHEN METABOLIC PANEL: CPT

## 2023-06-22 PROCEDURE — 85025 COMPLETE CBC W/AUTO DIFF WBC: CPT

## 2023-06-23 LAB
CHOLEST SERPL-MCNC: 176 MG/DL (ref 0–200)
CHOLEST/HDLC SERPL: 2 {RATIO}
HDLC SERPL-MCNC: 87 MG/DL (ref 40–60)
IRON SATN MFR SERPL: 28 % (ref 14–50)
IRON SERPL-MCNC: 121 ΜG/DL (ref 50–170)
LDLC SERPL CALC-MCNC: 73 MG/DL
NONHDLC SERPL-MCNC: 89 MG/DL
TIBC SERPL-MCNC: 437 ΜG/DL (ref 250–450)
TRIGL SERPL-MCNC: 80 MG/DL (ref 35–150)
VLDLC SERPL-MCNC: 16 MG/DL

## 2023-07-15 ENCOUNTER — HOSPITAL ENCOUNTER (EMERGENCY)
Facility: HOSPITAL | Age: 38
Discharge: HOME | End: 2023-07-15
Payer: COMMERCIAL

## 2023-07-15 VITALS
HEART RATE: 88 BPM | RESPIRATION RATE: 16 BRPM | SYSTOLIC BLOOD PRESSURE: 111 MMHG | DIASTOLIC BLOOD PRESSURE: 81 MMHG | OXYGEN SATURATION: 98 % | HEIGHT: 65 IN | BODY MASS INDEX: 48.82 KG/M2 | WEIGHT: 293 LBS | TEMPERATURE: 98 F

## 2023-07-15 DIAGNOSIS — E86.0 MILD DEHYDRATION: ICD-10-CM

## 2023-07-15 DIAGNOSIS — E87.6 HYPOKALEMIA: ICD-10-CM

## 2023-07-15 DIAGNOSIS — M62.838 MUSCLE SPASM: Primary | ICD-10-CM

## 2023-07-15 LAB
ALBUMIN SERPL BCP-MCNC: 3.5 G/DL (ref 3.5–5)
ALBUMIN/GLOB SERPL: 0.9 {RATIO}
ALP SERPL-CCNC: 99 U/L (ref 37–98)
ALT SERPL W P-5'-P-CCNC: 33 U/L (ref 13–56)
ANION GAP SERPL CALCULATED.3IONS-SCNC: 12 MMOL/L (ref 7–16)
AST SERPL W P-5'-P-CCNC: 36 U/L (ref 15–37)
BASOPHILS # BLD AUTO: 0.02 K/UL (ref 0–0.2)
BASOPHILS NFR BLD AUTO: 0.2 % (ref 0–1)
BILIRUB SERPL-MCNC: 0.5 MG/DL (ref ?–1.2)
BUN SERPL-MCNC: 22 MG/DL (ref 7–18)
BUN/CREAT SERPL: 18 (ref 6–20)
CALCIUM SERPL-MCNC: 8.7 MG/DL (ref 8.5–10.1)
CHLORIDE SERPL-SCNC: 99 MMOL/L (ref 98–107)
CO2 SERPL-SCNC: 29 MMOL/L (ref 21–32)
CREAT SERPL-MCNC: 1.21 MG/DL (ref 0.55–1.02)
DIFFERENTIAL METHOD BLD: ABNORMAL
EGFR (NO RACE VARIABLE) (RUSH/TITUS): 59 ML/MIN/1.73M2
EOSINOPHIL # BLD AUTO: 0.03 K/UL (ref 0–0.5)
EOSINOPHIL NFR BLD AUTO: 0.2 % (ref 1–4)
ERYTHROCYTE [DISTWIDTH] IN BLOOD BY AUTOMATED COUNT: 14.5 % (ref 11.5–14.5)
GLOBULIN SER-MCNC: 3.7 G/DL (ref 2–4)
GLUCOSE SERPL-MCNC: 119 MG/DL (ref 74–106)
HCT VFR BLD AUTO: 41.8 % (ref 38–47)
HGB BLD-MCNC: 13.4 G/DL (ref 12–16)
LYMPHOCYTES # BLD AUTO: 2.6 K/UL (ref 1–4.8)
LYMPHOCYTES NFR BLD AUTO: 21.5 % (ref 27–41)
MAGNESIUM SERPL-MCNC: 2 MG/DL (ref 1.7–2.3)
MCH RBC QN AUTO: 30.6 PG (ref 27–31)
MCHC RBC AUTO-ENTMCNC: 32.1 G/DL (ref 32–36)
MCV RBC AUTO: 95.4 FL (ref 80–96)
MONOCYTES # BLD AUTO: 1.07 K/UL (ref 0–0.8)
MONOCYTES NFR BLD AUTO: 8.9 % (ref 2–6)
MPC BLD CALC-MCNC: 9.4 FL (ref 9.4–12.4)
NEUTROPHILS # BLD AUTO: 8.36 K/UL (ref 1.8–7.7)
NEUTROPHILS NFR BLD AUTO: 69.2 % (ref 53–65)
PLATELET # BLD AUTO: 418 K/UL (ref 150–400)
POTASSIUM SERPL-SCNC: 3 MMOL/L (ref 3.5–5.1)
PROT SERPL-MCNC: 7.2 G/DL (ref 6.4–8.2)
RBC # BLD AUTO: 4.38 M/UL (ref 4.2–5.4)
SODIUM SERPL-SCNC: 137 MMOL/L (ref 136–145)
WBC # BLD AUTO: 12.08 K/UL (ref 4.5–11)

## 2023-07-15 PROCEDURE — 99284 PR EMERGENCY DEPT VISIT,LEVEL IV: ICD-10-PCS | Mod: ,,, | Performed by: NURSE PRACTITIONER

## 2023-07-15 PROCEDURE — 85025 COMPLETE CBC W/AUTO DIFF WBC: CPT | Performed by: NURSE PRACTITIONER

## 2023-07-15 PROCEDURE — 83735 ASSAY OF MAGNESIUM: CPT | Performed by: NURSE PRACTITIONER

## 2023-07-15 PROCEDURE — 80053 COMPREHEN METABOLIC PANEL: CPT | Performed by: NURSE PRACTITIONER

## 2023-07-15 PROCEDURE — 99284 EMERGENCY DEPT VISIT MOD MDM: CPT | Mod: ,,, | Performed by: NURSE PRACTITIONER

## 2023-07-15 PROCEDURE — 25000003 PHARM REV CODE 250: Performed by: NURSE PRACTITIONER

## 2023-07-15 PROCEDURE — 96360 HYDRATION IV INFUSION INIT: CPT

## 2023-07-15 PROCEDURE — 99284 EMERGENCY DEPT VISIT MOD MDM: CPT

## 2023-07-15 RX ORDER — POTASSIUM CHLORIDE 750 MG/1
10 TABLET, EXTENDED RELEASE ORAL 2 TIMES DAILY
Qty: 4 TABLET | Refills: 0 | Status: SHIPPED | OUTPATIENT
Start: 2023-07-15 | End: 2023-07-17

## 2023-07-15 RX ORDER — POTASSIUM CHLORIDE 20 MEQ/1
40 TABLET, EXTENDED RELEASE ORAL
Status: COMPLETED | OUTPATIENT
Start: 2023-07-15 | End: 2023-07-15

## 2023-07-15 RX ADMIN — POTASSIUM CHLORIDE 40 MEQ: 1500 TABLET, EXTENDED RELEASE ORAL at 10:07

## 2023-07-15 RX ADMIN — SODIUM CHLORIDE 1000 ML: 9 INJECTION, SOLUTION INTRAVENOUS at 10:07

## 2023-07-15 NOTE — Clinical Note
"Summer "Summer" Janna Grant was seen and treated in our emergency department on 7/15/2023.  She may return to work on 07/16/2023.       If you have any questions or concerns, please don't hesitate to call.      MELIZA Santos"

## 2023-07-16 NOTE — DISCHARGE INSTRUCTIONS
Hydrate well with water.  Take medication as prescribed.  Call your family doctor or practitioner on Monday to let them know about your increased muscle cramps and your ER visit.  Let them know your potassium level was low and you need it rechecked this week. Return to the ER for increased cramping, shortness of breath, chest pain or palpitations.

## 2023-07-16 NOTE — ED TRIAGE NOTES
"Pt reports she has had spasms/muscle cramps for the last week. Pt reports at this moment is the "stillest" its been all day. Pt words are slurred during triage. Pt has stated she took her evening medications prior to coming to ED.   "

## 2023-07-16 NOTE — ED PROVIDER NOTES
Encounter Date: 7/15/2023       History     Chief Complaint   Patient presents with    Spasms     Muscle spasms (chronic) has been worse.     38 year old  female presents to the ER for cramping to anai feet and anai hands.  She reports this is chronic in nature, but has worsened today since being placed on HCTZ a week ago. Denies leg/arm swelling, chest pain, SOB, palpitations.  Pt noted to be drowsy during hx and assessment.  Pt apologized for her being drowsy, stating she took her bedtime medications.     The history is provided by the patient.   Spasms  This is a chronic problem. The problem occurs daily. The problem has been gradually worsening. Pertinent negatives include no chest pain, no abdominal pain, no headaches and no shortness of breath. Nothing relieves the symptoms.   Review of patient's allergies indicates:   Allergen Reactions    Adhesive tape-silicones     Hydrocodone-acetaminophen Itching    Latex, natural rubber      Past Medical History:   Diagnosis Date    ADHD     Anxiety     Edema, lower extremity     Pain in left leg     Venous insufficiency      Past Surgical History:   Procedure Laterality Date    ABDOMINAL SURGERY  11/18/2020    Gastric sleeve performed by Dr Villarreal in [place]    APPENDECTOMY      EPIDURAL STEROID INJECTION INTO LUMBAR SPINE  2012    L4-5, in [place].    TONSILLECTOMY      VENOUS ABLATION Left 03/30/2015    GS[facility] Ablation performed by Dr. Steven Llamas.     Family History   Problem Relation Age of Onset    Hypertension Mother     Bipolar disorder Father     Hyperlipidemia Father     Hypertension Father     Scoliosis Brother      Social History     Tobacco Use    Smoking status: Never     Passive exposure: Never    Smokeless tobacco: Never   Substance Use Topics    Alcohol use: Yes     Comment: last drink was this evening at supper.    Drug use: Never     Review of Systems   Constitutional:  Negative for fever.   HENT:  Negative for sore throat.    Respiratory:  Negative  for shortness of breath.    Cardiovascular:  Negative for chest pain, palpitations and leg swelling.   Gastrointestinal:  Negative for abdominal pain and nausea.   Genitourinary:  Negative for dysuria.   Musculoskeletal:  Negative for back pain.   Skin:  Negative for rash.   Neurological:  Negative for weakness and headaches.   Hematological:  Does not bruise/bleed easily.     Physical Exam     Initial Vitals [07/15/23 2156]   BP Pulse Resp Temp SpO2   125/82 105 18 98.1 °F (36.7 °C) 99 %      MAP       --         Physical Exam    Nursing note and vitals reviewed.  Constitutional: She appears well-developed and well-nourished. She is not diaphoretic. She is Obese . She is cooperative.  Non-toxic appearance. She does not have a sickly appearance. She does not appear ill. No distress.   Drowsy but awake   HENT:   Head: Normocephalic and atraumatic.   Mouth/Throat: Mucous membranes are dry.   Eyes: Pupils are equal, round, and reactive to light.   Neck: Neck supple.   Cardiovascular:  Normal rate, regular rhythm, normal heart sounds, intact distal pulses and normal pulses.     Exam reveals no gallop and no friction rub.       No murmur heard.  Pulmonary/Chest: Breath sounds normal. No respiratory distress. She has no wheezes. She has no rhonchi. She has no rales. She exhibits no tenderness.   Musculoskeletal:      Cervical back: Neck supple.     Neurological: She is oriented to person, place, and time.   Skin: Skin is warm and dry. Capillary refill takes less than 2 seconds.   Psychiatric: She has a normal mood and affect.       Medical Screening Exam   See Full Note    ED Course   Procedures  Labs Reviewed   COMPREHENSIVE METABOLIC PANEL - Abnormal; Notable for the following components:       Result Value    Potassium 3.0 (*)     Glucose 119 (*)     BUN 22 (*)     Creatinine 1.21 (*)     Alk Phos 99 (*)     eGFR 59 (*)     All other components within normal limits   CBC WITH DIFFERENTIAL - Abnormal; Notable for the  following components:    WBC 12.08 (*)     Platelet Count 418 (*)     Neutrophils % 69.2 (*)     Lymphocytes % 21.5 (*)     Neutrophils, Abs 8.36 (*)     Monocytes % 8.9 (*)     Eosinophils % 0.2 (*)     Monocytes, Absolute 1.07 (*)     All other components within normal limits   MAGNESIUM - Normal   CBC W/ AUTO DIFFERENTIAL    Narrative:     The following orders were created for panel order CBC auto differential.  Procedure                               Abnormality         Status                     ---------                               -----------         ------                     CBC with Differential[[id number]]        Abnormal            Final result                 Please view results for these tests on the individual orders.          Imaging Results    None          Medications   sodium chloride 0.9% bolus 1,000 mL 1,000 mL (has no administration in time range)   potassium chloride SA CR tablet 40 mEq (40 mEq Oral Given 7/15/23 2242)     Medical Decision Making:   Differential Diagnosis:   Electrolyte abnormality, chronic spasms  Clinical Tests:   Lab Tests: Ordered and Reviewed  ED Management:  Will check electrolytes           ED Course as of 07/15/23 2245   Sat Jul 15, 2023   2228 WBC(!): 12.08 [AG]   2228 Platelets(!): 418 [AG]   2233 Potassium(!): 3.0  Will replace with oral potassium [AG]   2234 BUN(!): 22  Will give IVFs [AG]   2234 Creatinine(!): 1.21  Will give IVFs [AG]      ED Course User Index  [AG] MELIZA Santos                Clinical Impression:   Final diagnoses:  [M62.838] Muscle spasm (Primary)  [E87.6] Hypokalemia  [E86.0] Mild dehydration        ED Disposition Condition    Discharge Stable          ED Prescriptions       Medication Sig Dispense Start Date End Date Auth. Provider    potassium chloride SA (K-DUR,KLOR-CON M) 10 MEQ tablet Take 1 tablet (10 mEq total) by mouth 2 (two) times daily. for 2 days 4 tablet 7/15/2023 7/17/2023 MELIZA Santos          Follow-up Information        Follow up With Specialties Details Why Contact Info    TAYLOR Osorio Nurse Practitioner Schedule an appointment as soon as possible for a visit in 3 days for recheck of your symptoms and to repeat your lab [address] [place] [address] [place] [id number] [phone]               MELIZA Santos  07/15/23 2243       MELIZA Santos  07/15/23 2247

## 2023-09-28 ENCOUNTER — HOSPITAL ENCOUNTER (EMERGENCY)
Facility: HOSPITAL | Age: 38
Discharge: HOME | End: 2023-09-28
Payer: COMMERCIAL

## 2023-09-28 VITALS
TEMPERATURE: 98 F | BODY MASS INDEX: 48.82 KG/M2 | DIASTOLIC BLOOD PRESSURE: 96 MMHG | HEART RATE: 71 BPM | RESPIRATION RATE: 18 BRPM | HEIGHT: 65 IN | SYSTOLIC BLOOD PRESSURE: 137 MMHG | WEIGHT: 293 LBS | OXYGEN SATURATION: 99 %

## 2023-09-28 DIAGNOSIS — L84 CORN OF TOE: Primary | ICD-10-CM

## 2023-09-28 PROCEDURE — 99284 EMERGENCY DEPT VISIT MOD MDM: CPT

## 2023-09-28 PROCEDURE — 99284 EMERGENCY DEPT VISIT MOD MDM: CPT | Mod: ,,, | Performed by: NURSE PRACTITIONER

## 2023-09-28 PROCEDURE — 25000003 PHARM REV CODE 250: Performed by: NURSE PRACTITIONER

## 2023-09-28 PROCEDURE — 99284 PR EMERGENCY DEPT VISIT,LEVEL IV: ICD-10-PCS | Mod: ,,, | Performed by: NURSE PRACTITIONER

## 2023-09-28 RX ORDER — OXYCODONE AND ACETAMINOPHEN 5; 325 MG/1; MG/1
1 TABLET ORAL EVERY 12 HOURS PRN
Qty: 6 TABLET | Refills: 0 | Status: SHIPPED | OUTPATIENT
Start: 2023-09-28 | End: 2023-10-01

## 2023-09-28 RX ORDER — CARIPRAZINE 3 MG/1
3 CAPSULE, GELATIN COATED ORAL
COMMUNITY
Start: 2023-09-06

## 2023-09-28 RX ORDER — OXYCODONE AND ACETAMINOPHEN 5; 325 MG/1; MG/1
1 TABLET ORAL
Status: COMPLETED | OUTPATIENT
Start: 2023-09-28 | End: 2023-09-28

## 2023-09-28 RX ORDER — POTASSIUM CHLORIDE 750 MG/1
10 CAPSULE, EXTENDED RELEASE ORAL
COMMUNITY
Start: 2023-09-14

## 2023-09-28 RX ORDER — LIDOCAINE HYDROCHLORIDE 10 MG/ML
1 INJECTION, SOLUTION EPIDURAL; INFILTRATION; INTRACAUDAL; PERINEURAL
Status: COMPLETED | OUTPATIENT
Start: 2023-09-28 | End: 2023-09-28

## 2023-09-28 RX ORDER — BUSPIRONE HYDROCHLORIDE 15 MG/1
15 TABLET ORAL 2 TIMES DAILY
COMMUNITY
Start: 2023-09-12

## 2023-09-28 RX ADMIN — OXYCODONE HYDROCHLORIDE AND ACETAMINOPHEN 1 TABLET: 5; 325 TABLET ORAL at 08:09

## 2023-09-28 RX ADMIN — LIDOCAINE HYDROCHLORIDE 10 MG: 10 INJECTION, SOLUTION EPIDURAL; INFILTRATION; INTRACAUDAL; PERINEURAL at 09:09

## 2023-09-29 NOTE — ED PROVIDER NOTES
"Encounter Date: 9/28/2023       History     Chief Complaint   Patient presents with    Wound Infection     Pt states that she has had a corn on her right 4th lateral toe for one year that has become worse over the last month. She was seen yesterday at clinic and it was shaved. Today she has increased pain.     Presents to ED with c/o corn to right fourth toe. Pain 10/10. Says she was seen in local clinic yesterday and had corn "shaved"---pain has exacerbated since then. She has used otc topical corn treatment without improvement.     The history is provided by the patient.     Review of patient's allergies indicates:   Allergen Reactions    Adhesive tape-silicones     Hydrocodone-acetaminophen Itching    Latex, natural rubber     Nucala [mepolizumab]      Past Medical History:   Diagnosis Date    ADHD     Anxiety     Edema, lower extremity     Pain in left leg     Venous insufficiency      Past Surgical History:   Procedure Laterality Date    ABDOMINAL SURGERY  11/18/2020    Gastric sleeve performed by Dr Villarreal in [place]    APPENDECTOMY      EPIDURAL STEROID INJECTION INTO LUMBAR SPINE  2012    L4-5, in [place].    TONSILLECTOMY      VENOUS ABLATION Left 03/30/2015    GSV Ablation performed by Dr. Steven Llamas.     Family History   Problem Relation Age of Onset    Hypertension Mother     Bipolar disorder Father     Hyperlipidemia Father     Hypertension Father     Scoliosis Brother      Social History     Tobacco Use    Smoking status: Never     Passive exposure: Never    Smokeless tobacco: Never   Substance Use Topics    Alcohol use: Yes     Comment: last drink was this evening at supper.    Drug use: Never     Review of Systems   Constitutional: Negative.    HENT: Negative.     Respiratory: Negative.  Negative for shortness of breath.    Cardiovascular: Negative.    Gastrointestinal: Negative.    Musculoskeletal: Negative.    Skin:         Corn to right fourth toe   Neurological: Negative.    All other systems " reviewed and are negative.      Physical Exam     Initial Vitals [09/28/23 2006]   BP Pulse Resp Temp SpO2   (!) 142/95 93 16 97.7 °F (36.5 °C) 100 %      MAP       --         Physical Exam    Nursing note and vitals reviewed.  Constitutional: She appears well-developed and well-nourished.   HENT:   Head: Normocephalic and atraumatic.   Nose: Nose normal.   Mouth/Throat: Oropharynx is clear and moist.   Eyes: Conjunctivae and EOM are normal. Pupils are equal, round, and reactive to light.   Neck: Neck supple. No JVD present.   Normal range of motion.  Cardiovascular:  Normal rate, regular rhythm, normal heart sounds and intact distal pulses.           No murmur heard.  Pulmonary/Chest: Breath sounds normal.   Abdominal: Abdomen is soft. Bowel sounds are normal.   Musculoskeletal:         General: Normal range of motion.      Cervical back: Normal range of motion and neck supple.     Neurological: She is alert and oriented to person, place, and time. She has normal strength. GCS score is 15. GCS eye subscore is 4. GCS verbal subscore is 5. GCS motor subscore is 6.   Skin: Skin is warm and dry. Capillary refill takes less than 2 seconds.   Hardened corn to lateral surface of right fourth toe.   Psychiatric: She has a normal mood and affect. Her behavior is normal. Judgment and thought content normal.         Medical Screening Exam   See Full Note    ED Course   Procedures  Labs Reviewed - No data to display       Imaging Results    None          Medications   LIDOcaine (PF) 10 mg/ml (1%) injection 10 mg (10 mg Infiltration Given 9/28/23 2105)   oxyCODONE-acetaminophen 5-325 mg per tablet 1 tablet (1 tablet Oral Given 9/28/23 2058)     Medical Decision Making  Discussed plan of care w/ patient. Advised to f/u with podiatry ASAP. Will treat w/ topical salicylic acid and short term opioids for pain. VU w/ plan.     Amount and/or Complexity of Data Reviewed  Discussion of management or test interpretation with external  provider(s): Injected 0.1mL 1% lidocaine into corn. Applied cotton ball between toe for comfort.                                Clinical Impression:   Final diagnoses:  [L84] Corn of toe (Primary)        ED Disposition Condition    Discharge Good          ED Prescriptions       Medication Sig Dispense Start Date End Date Auth. Provider    salicylic acid 17 % gel Apply topically once daily. for 10 days 15 g 9/28/2023 10/8/2023 Oni Gann, NP    oxyCODONE-acetaminophen (PERCOCET) 5-325 mg per tablet Take 1 tablet by mouth every 12 (twelve) hours as needed for Pain. 6 tablet 9/28/2023 10/1/2023 Oni Gann, BRANDI          Follow-up Information    None          Oni Gann NP  09/28/23 212

## 2023-09-29 NOTE — DISCHARGE INSTRUCTIONS
Use Compound W or Dr. Hernandez Corn / Callus Liquid / Gel   Keep cotton ball between toes  Take Rx as directed  Use lidoderm patches to cover the affected area  Follow up with podiatry